# Patient Record
Sex: MALE | Race: BLACK OR AFRICAN AMERICAN | NOT HISPANIC OR LATINO | Employment: OTHER | URBAN - METROPOLITAN AREA
[De-identification: names, ages, dates, MRNs, and addresses within clinical notes are randomized per-mention and may not be internally consistent; named-entity substitution may affect disease eponyms.]

---

## 2024-09-01 ENCOUNTER — LAB REQUISITION (OUTPATIENT)
Dept: LAB | Facility: CLINIC | Age: 64
End: 2024-09-01
Payer: MEDICARE

## 2024-09-01 DIAGNOSIS — I69.392 FACIAL WEAKNESS FOLLOWING CEREBRAL INFARCTION: ICD-10-CM

## 2024-09-01 DIAGNOSIS — Z11.1 ENCOUNTER FOR SCREENING FOR RESPIRATORY TUBERCULOSIS: ICD-10-CM

## 2024-09-01 DIAGNOSIS — I69.393 ATAXIA FOLLOWING CEREBRAL INFARCTION: ICD-10-CM

## 2024-09-01 DIAGNOSIS — I69.354 HEMIPLEGIA AND HEMIPARESIS FOLLOWING CEREBRAL INFARCTION AFFECTING LEFT NON-DOMINANT SIDE (H): ICD-10-CM

## 2024-09-01 DIAGNOSIS — Z94.0 KIDNEY TRANSPLANT STATUS: ICD-10-CM

## 2024-09-03 LAB
ANION GAP SERPL CALCULATED.3IONS-SCNC: 15 MMOL/L (ref 7–15)
BUN SERPL-MCNC: 20.3 MG/DL (ref 8–23)
CALCIUM SERPL-MCNC: 10 MG/DL (ref 8.8–10.4)
CHLORIDE SERPL-SCNC: 107 MMOL/L (ref 98–107)
CREAT SERPL-MCNC: 1.6 MG/DL (ref 0.67–1.17)
EGFRCR SERPLBLD CKD-EPI 2021: 48 ML/MIN/1.73M2
GLUCOSE SERPL-MCNC: 57 MG/DL (ref 70–99)
HCO3 SERPL-SCNC: 20 MMOL/L (ref 22–29)
POTASSIUM SERPL-SCNC: 4.2 MMOL/L (ref 3.4–5.3)
SODIUM SERPL-SCNC: 142 MMOL/L (ref 135–145)

## 2024-09-03 PROCEDURE — P9604 ONE-WAY ALLOW PRORATED TRIP: HCPCS | Mod: ORL | Performed by: FAMILY MEDICINE

## 2024-09-03 PROCEDURE — 80048 BASIC METABOLIC PNL TOTAL CA: CPT | Mod: ORL | Performed by: FAMILY MEDICINE

## 2024-09-03 PROCEDURE — 86481 TB AG RESPONSE T-CELL SUSP: CPT | Mod: ORL | Performed by: FAMILY MEDICINE

## 2024-09-03 PROCEDURE — 36415 COLL VENOUS BLD VENIPUNCTURE: CPT | Mod: ORL | Performed by: FAMILY MEDICINE

## 2024-09-04 ENCOUNTER — LAB REQUISITION (OUTPATIENT)
Dept: LAB | Facility: CLINIC | Age: 64
End: 2024-09-04
Payer: MEDICARE

## 2024-09-04 DIAGNOSIS — I69.393 ATAXIA FOLLOWING CEREBRAL INFARCTION: ICD-10-CM

## 2024-09-04 DIAGNOSIS — D64.9 ANEMIA, UNSPECIFIED: ICD-10-CM

## 2024-09-04 DIAGNOSIS — N18.6 END STAGE RENAL DISEASE (H): ICD-10-CM

## 2024-09-04 DIAGNOSIS — I69.354 HEMIPLEGIA AND HEMIPARESIS FOLLOWING CEREBRAL INFARCTION AFFECTING LEFT NON-DOMINANT SIDE (H): ICD-10-CM

## 2024-09-04 DIAGNOSIS — I69.392 FACIAL WEAKNESS FOLLOWING CEREBRAL INFARCTION: ICD-10-CM

## 2024-09-05 LAB
GAMMA INTERFERON BACKGROUND BLD IA-ACNC: 0 IU/ML
M TB IFN-G BLD-IMP: NEGATIVE
M TB IFN-G CD4+ BCKGRND COR BLD-ACNC: 3.43 IU/ML
MITOGEN IGNF BCKGRD COR BLD-ACNC: 0.05 IU/ML
MITOGEN IGNF BCKGRD COR BLD-ACNC: 0.1 IU/ML
QUANTIFERON MITOGEN: 3.43 IU/ML
QUANTIFERON NIL TUBE: 0 IU/ML
QUANTIFERON TB1 TUBE: 0.05 IU/ML
QUANTIFERON TB2 TUBE: 0.1

## 2024-09-09 LAB
ALBUMIN SERPL BCG-MCNC: 4.1 G/DL (ref 3.5–5.2)
ALP SERPL-CCNC: 95 U/L (ref 40–150)
ALT SERPL W P-5'-P-CCNC: 13 U/L (ref 0–70)
ANION GAP SERPL CALCULATED.3IONS-SCNC: 11 MMOL/L (ref 7–15)
AST SERPL W P-5'-P-CCNC: 19 U/L (ref 0–45)
BASOPHILS # BLD AUTO: 0.1 10E3/UL (ref 0–0.2)
BASOPHILS NFR BLD AUTO: 1 %
BILIRUB SERPL-MCNC: 0.9 MG/DL
BUN SERPL-MCNC: 17.8 MG/DL (ref 8–23)
CALCIUM SERPL-MCNC: 9.7 MG/DL (ref 8.8–10.4)
CHLORIDE SERPL-SCNC: 105 MMOL/L (ref 98–107)
CREAT SERPL-MCNC: 1.39 MG/DL (ref 0.67–1.17)
EGFRCR SERPLBLD CKD-EPI 2021: 56 ML/MIN/1.73M2
EOSINOPHIL # BLD AUTO: 0.2 10E3/UL (ref 0–0.7)
EOSINOPHIL NFR BLD AUTO: 2 %
ERYTHROCYTE [DISTWIDTH] IN BLOOD BY AUTOMATED COUNT: 12.8 % (ref 10–15)
GLUCOSE SERPL-MCNC: 142 MG/DL (ref 70–99)
HCO3 SERPL-SCNC: 24 MMOL/L (ref 22–29)
HCT VFR BLD AUTO: 40.2 % (ref 40–53)
HGB BLD-MCNC: 12.7 G/DL (ref 13.3–17.7)
IMM GRANULOCYTES # BLD: 0.1 10E3/UL
IMM GRANULOCYTES NFR BLD: 1 %
LYMPHOCYTES # BLD AUTO: 2.2 10E3/UL (ref 0.8–5.3)
LYMPHOCYTES NFR BLD AUTO: 26 %
MCH RBC QN AUTO: 29.1 PG (ref 26.5–33)
MCHC RBC AUTO-ENTMCNC: 31.6 G/DL (ref 31.5–36.5)
MCV RBC AUTO: 92 FL (ref 78–100)
MONOCYTES # BLD AUTO: 1 10E3/UL (ref 0–1.3)
MONOCYTES NFR BLD AUTO: 11 %
NEUTROPHILS # BLD AUTO: 5.2 10E3/UL (ref 1.6–8.3)
NEUTROPHILS NFR BLD AUTO: 59 %
NRBC # BLD AUTO: 0 10E3/UL
NRBC BLD AUTO-RTO: 0 /100
PLATELET # BLD AUTO: 192 10E3/UL (ref 150–450)
POTASSIUM SERPL-SCNC: 3.9 MMOL/L (ref 3.4–5.3)
PROT SERPL-MCNC: 7.2 G/DL (ref 6.4–8.3)
RBC # BLD AUTO: 4.37 10E6/UL (ref 4.4–5.9)
SODIUM SERPL-SCNC: 140 MMOL/L (ref 135–145)
WBC # BLD AUTO: 8.7 10E3/UL (ref 4–11)

## 2024-09-09 PROCEDURE — 36415 COLL VENOUS BLD VENIPUNCTURE: CPT | Mod: ORL | Performed by: FAMILY MEDICINE

## 2024-09-09 PROCEDURE — P9603 ONE-WAY ALLOW PRORATED MILES: HCPCS | Mod: ORL | Performed by: FAMILY MEDICINE

## 2024-09-09 PROCEDURE — 80053 COMPREHEN METABOLIC PANEL: CPT | Mod: ORL | Performed by: FAMILY MEDICINE

## 2024-09-09 PROCEDURE — 85025 COMPLETE CBC W/AUTO DIFF WBC: CPT | Mod: ORL | Performed by: FAMILY MEDICINE

## 2024-09-11 ENCOUNTER — LAB REQUISITION (OUTPATIENT)
Dept: LAB | Facility: CLINIC | Age: 64
End: 2024-09-11
Payer: MEDICARE

## 2024-09-11 DIAGNOSIS — D64.9 ANEMIA, UNSPECIFIED: ICD-10-CM

## 2024-09-11 DIAGNOSIS — Z94.0 KIDNEY TRANSPLANT STATUS: ICD-10-CM

## 2024-09-11 DIAGNOSIS — N18.6 END STAGE RENAL DISEASE (H): ICD-10-CM

## 2024-09-12 ENCOUNTER — LAB REQUISITION (OUTPATIENT)
Dept: LAB | Facility: CLINIC | Age: 64
End: 2024-09-12
Payer: MEDICARE

## 2024-09-12 DIAGNOSIS — R31.9 HEMATURIA, UNSPECIFIED: ICD-10-CM

## 2024-09-12 LAB
ALBUMIN SERPL BCG-MCNC: 3.8 G/DL (ref 3.5–5.2)
ALBUMIN UR-MCNC: NEGATIVE MG/DL
ALP SERPL-CCNC: 90 U/L (ref 40–150)
ALT SERPL W P-5'-P-CCNC: 12 U/L (ref 0–70)
ANION GAP SERPL CALCULATED.3IONS-SCNC: 11 MMOL/L (ref 7–15)
APPEARANCE UR: CLEAR
AST SERPL W P-5'-P-CCNC: 19 U/L (ref 0–45)
BILIRUB SERPL-MCNC: 0.9 MG/DL
BILIRUB UR QL STRIP: NEGATIVE
BUN SERPL-MCNC: 17.5 MG/DL (ref 8–23)
CALCIUM SERPL-MCNC: 9.1 MG/DL (ref 8.8–10.4)
CHLORIDE SERPL-SCNC: 105 MMOL/L (ref 98–107)
COLOR UR AUTO: ABNORMAL
CREAT SERPL-MCNC: 1.27 MG/DL (ref 0.67–1.17)
EGFRCR SERPLBLD CKD-EPI 2021: 63 ML/MIN/1.73M2
ERYTHROCYTE [DISTWIDTH] IN BLOOD BY AUTOMATED COUNT: 12.7 % (ref 10–15)
GLUCOSE SERPL-MCNC: 150 MG/DL (ref 70–99)
GLUCOSE UR STRIP-MCNC: NEGATIVE MG/DL
HCO3 SERPL-SCNC: 24 MMOL/L (ref 22–29)
HCT VFR BLD AUTO: 35.7 % (ref 40–53)
HGB BLD-MCNC: 11.6 G/DL (ref 13.3–17.7)
HGB UR QL STRIP: NEGATIVE
HYALINE CASTS: 1 /LPF
KETONES UR STRIP-MCNC: NEGATIVE MG/DL
LEUKOCYTE ESTERASE UR QL STRIP: NEGATIVE
MCH RBC QN AUTO: 29.4 PG (ref 26.5–33)
MCHC RBC AUTO-ENTMCNC: 32.5 G/DL (ref 31.5–36.5)
MCV RBC AUTO: 91 FL (ref 78–100)
MUCOUS THREADS #/AREA URNS LPF: PRESENT /LPF
NITRATE UR QL: NEGATIVE
PH UR STRIP: 7 [PH] (ref 5–7)
PLATELET # BLD AUTO: 180 10E3/UL (ref 150–450)
POTASSIUM SERPL-SCNC: 3.9 MMOL/L (ref 3.4–5.3)
PROT SERPL-MCNC: 6.8 G/DL (ref 6.4–8.3)
RBC # BLD AUTO: 3.94 10E6/UL (ref 4.4–5.9)
RBC URINE: 1 /HPF
SODIUM SERPL-SCNC: 140 MMOL/L (ref 135–145)
SP GR UR STRIP: 1.01 (ref 1–1.03)
TACROLIMUS BLD-MCNC: 6.3 UG/L (ref 5–15)
TME LAST DOSE: NORMAL H
TME LAST DOSE: NORMAL H
UROBILINOGEN UR STRIP-MCNC: NORMAL MG/DL
WBC # BLD AUTO: 8.9 10E3/UL (ref 4–11)
WBC URINE: <1 /HPF

## 2024-09-12 PROCEDURE — 36415 COLL VENOUS BLD VENIPUNCTURE: CPT | Mod: ORL | Performed by: FAMILY MEDICINE

## 2024-09-12 PROCEDURE — 80197 ASSAY OF TACROLIMUS: CPT | Mod: ORL | Performed by: FAMILY MEDICINE

## 2024-09-12 PROCEDURE — 80053 COMPREHEN METABOLIC PANEL: CPT | Mod: ORL | Performed by: FAMILY MEDICINE

## 2024-09-12 PROCEDURE — 85027 COMPLETE CBC AUTOMATED: CPT | Mod: ORL | Performed by: FAMILY MEDICINE

## 2024-09-12 PROCEDURE — P9603 ONE-WAY ALLOW PRORATED MILES: HCPCS | Mod: ORL | Performed by: FAMILY MEDICINE

## 2024-09-12 PROCEDURE — 81001 URINALYSIS AUTO W/SCOPE: CPT | Mod: ORL | Performed by: FAMILY MEDICINE

## 2024-09-12 PROCEDURE — 87086 URINE CULTURE/COLONY COUNT: CPT | Mod: ORL | Performed by: FAMILY MEDICINE

## 2024-09-13 LAB — BACTERIA UR CULT: NO GROWTH

## 2024-10-09 ENCOUNTER — APPOINTMENT (OUTPATIENT)
Dept: MRI IMAGING | Facility: CLINIC | Age: 64
DRG: 065 | End: 2024-10-09
Attending: PSYCHIATRY & NEUROLOGY
Payer: MEDICARE

## 2024-10-09 ENCOUNTER — APPOINTMENT (OUTPATIENT)
Dept: CT IMAGING | Facility: CLINIC | Age: 64
DRG: 065 | End: 2024-10-09
Attending: EMERGENCY MEDICINE
Payer: MEDICARE

## 2024-10-09 ENCOUNTER — HOSPITAL ENCOUNTER (INPATIENT)
Facility: CLINIC | Age: 64
LOS: 3 days | Discharge: HOME HEALTH | DRG: 065 | End: 2024-10-13
Attending: EMERGENCY MEDICINE | Admitting: STUDENT IN AN ORGANIZED HEALTH CARE EDUCATION/TRAINING PROGRAM
Payer: MEDICARE

## 2024-10-09 DIAGNOSIS — Z94.0 KIDNEY REPLACED BY TRANSPLANT: ICD-10-CM

## 2024-10-09 DIAGNOSIS — I10 ESSENTIAL HYPERTENSION: ICD-10-CM

## 2024-10-09 DIAGNOSIS — E10.29 TYPE 1 DIABETES MELLITUS WITH OTHER KIDNEY COMPLICATION (H): ICD-10-CM

## 2024-10-09 DIAGNOSIS — R56.9 SEIZURE (H): ICD-10-CM

## 2024-10-09 DIAGNOSIS — I63.9 ACUTE CVA (CEREBROVASCULAR ACCIDENT) (H): Primary | ICD-10-CM

## 2024-10-09 LAB
ALBUMIN SERPL BCG-MCNC: 4.2 G/DL (ref 3.5–5.2)
ALP SERPL-CCNC: 121 U/L (ref 40–150)
ALT SERPL W P-5'-P-CCNC: 11 U/L (ref 0–70)
ANION GAP SERPL CALCULATED.3IONS-SCNC: 17 MMOL/L (ref 7–15)
APTT PPP: 22 SECONDS (ref 22–38)
AST SERPL W P-5'-P-CCNC: 18 U/L (ref 0–45)
BASE EXCESS BLDV CALC-SCNC: -2 MMOL/L (ref -3–3)
BASOPHILS # BLD AUTO: 0.1 10E3/UL (ref 0–0.2)
BASOPHILS NFR BLD AUTO: 1 %
BILIRUB DIRECT SERPL-MCNC: <0.2 MG/DL (ref 0–0.3)
BILIRUB SERPL-MCNC: 0.3 MG/DL
BUN SERPL-MCNC: 16 MG/DL (ref 8–23)
CALCIUM SERPL-MCNC: 9.8 MG/DL (ref 8.8–10.4)
CHLORIDE SERPL-SCNC: 103 MMOL/L (ref 98–107)
CREAT SERPL-MCNC: 1.45 MG/DL (ref 0.67–1.17)
EGFRCR SERPLBLD CKD-EPI 2021: 53 ML/MIN/1.73M2
EOSINOPHIL # BLD AUTO: 0.1 10E3/UL (ref 0–0.7)
EOSINOPHIL NFR BLD AUTO: 1 %
ERYTHROCYTE [DISTWIDTH] IN BLOOD BY AUTOMATED COUNT: 13 % (ref 10–15)
GLUCOSE BLDC GLUCOMTR-MCNC: 159 MG/DL (ref 70–99)
GLUCOSE SERPL-MCNC: 140 MG/DL (ref 70–99)
HCO3 BLDV-SCNC: 24 MMOL/L (ref 21–28)
HCO3 SERPL-SCNC: 19 MMOL/L (ref 22–29)
HCT VFR BLD AUTO: 41.3 % (ref 40–53)
HGB BLD-MCNC: 13.3 G/DL (ref 13.3–17.7)
IMM GRANULOCYTES # BLD: 0.1 10E3/UL
IMM GRANULOCYTES NFR BLD: 1 %
INR PPP: 1 (ref 0.85–1.15)
LACTATE BLD-SCNC: 3.2 MMOL/L
LYMPHOCYTES # BLD AUTO: 3.2 10E3/UL (ref 0.8–5.3)
LYMPHOCYTES NFR BLD AUTO: 41 %
MCH RBC QN AUTO: 29.2 PG (ref 26.5–33)
MCHC RBC AUTO-ENTMCNC: 32.2 G/DL (ref 31.5–36.5)
MCV RBC AUTO: 91 FL (ref 78–100)
MONOCYTES # BLD AUTO: 0.6 10E3/UL (ref 0–1.3)
MONOCYTES NFR BLD AUTO: 8 %
NEUTROPHILS # BLD AUTO: 3.7 10E3/UL (ref 1.6–8.3)
NEUTROPHILS NFR BLD AUTO: 48 %
NRBC # BLD AUTO: 0 10E3/UL
NRBC BLD AUTO-RTO: 0 /100
PCO2 BLDV: 41 MM HG (ref 40–50)
PH BLDV: 7.37 [PH] (ref 7.32–7.43)
PLATELET # BLD AUTO: 195 10E3/UL (ref 150–450)
PO2 BLDV: 37 MM HG (ref 25–47)
POTASSIUM SERPL-SCNC: 4.3 MMOL/L (ref 3.4–5.3)
PROT SERPL-MCNC: 7.7 G/DL (ref 6.4–8.3)
RBC # BLD AUTO: 4.55 10E6/UL (ref 4.4–5.9)
SAO2 % BLDV: 68 % (ref 70–75)
SODIUM SERPL-SCNC: 139 MMOL/L (ref 135–145)
TROPONIN T SERPL HS-MCNC: 12 NG/L
WBC # BLD AUTO: 7.7 10E3/UL (ref 4–11)

## 2024-10-09 PROCEDURE — 93005 ELECTROCARDIOGRAM TRACING: CPT

## 2024-10-09 PROCEDURE — 82803 BLOOD GASES ANY COMBINATION: CPT

## 2024-10-09 PROCEDURE — 70496 CT ANGIOGRAPHY HEAD: CPT | Mod: MA

## 2024-10-09 PROCEDURE — 85730 THROMBOPLASTIN TIME PARTIAL: CPT | Performed by: EMERGENCY MEDICINE

## 2024-10-09 PROCEDURE — 99223 1ST HOSP IP/OBS HIGH 75: CPT | Mod: AI | Performed by: STUDENT IN AN ORGANIZED HEALTH CARE EDUCATION/TRAINING PROGRAM

## 2024-10-09 PROCEDURE — 258N000003 HC RX IP 258 OP 636: Performed by: STUDENT IN AN ORGANIZED HEALTH CARE EDUCATION/TRAINING PROGRAM

## 2024-10-09 PROCEDURE — 70498 CT ANGIOGRAPHY NECK: CPT | Mod: MA

## 2024-10-09 PROCEDURE — 85610 PROTHROMBIN TIME: CPT | Performed by: EMERGENCY MEDICINE

## 2024-10-09 PROCEDURE — 36415 COLL VENOUS BLD VENIPUNCTURE: CPT | Performed by: EMERGENCY MEDICINE

## 2024-10-09 PROCEDURE — 250N000009 HC RX 250: Performed by: EMERGENCY MEDICINE

## 2024-10-09 PROCEDURE — 70551 MRI BRAIN STEM W/O DYE: CPT

## 2024-10-09 PROCEDURE — G0509 CRIT CARE TELEHEA CONSULT 50: HCPCS | Mod: G0 | Performed by: PSYCHIATRY & NEUROLOGY

## 2024-10-09 PROCEDURE — 99291 CRITICAL CARE FIRST HOUR: CPT | Mod: 25

## 2024-10-09 PROCEDURE — 82248 BILIRUBIN DIRECT: CPT | Performed by: EMERGENCY MEDICINE

## 2024-10-09 PROCEDURE — 96376 TX/PRO/DX INJ SAME DRUG ADON: CPT

## 2024-10-09 PROCEDURE — 99292 CRITICAL CARE ADDL 30 MIN: CPT

## 2024-10-09 PROCEDURE — 85025 COMPLETE CBC W/AUTO DIFF WBC: CPT | Performed by: EMERGENCY MEDICINE

## 2024-10-09 PROCEDURE — 82550 ASSAY OF CK (CPK): CPT | Performed by: STUDENT IN AN ORGANIZED HEALTH CARE EDUCATION/TRAINING PROGRAM

## 2024-10-09 PROCEDURE — 250N000011 HC RX IP 250 OP 636: Performed by: EMERGENCY MEDICINE

## 2024-10-09 PROCEDURE — 70450 CT HEAD/BRAIN W/O DYE: CPT | Mod: MA

## 2024-10-09 PROCEDURE — 84484 ASSAY OF TROPONIN QUANT: CPT | Performed by: EMERGENCY MEDICINE

## 2024-10-09 PROCEDURE — 87040 BLOOD CULTURE FOR BACTERIA: CPT | Performed by: EMERGENCY MEDICINE

## 2024-10-09 PROCEDURE — 80061 LIPID PANEL: CPT | Performed by: NURSE PRACTITIONER

## 2024-10-09 PROCEDURE — 96375 TX/PRO/DX INJ NEW DRUG ADDON: CPT

## 2024-10-09 PROCEDURE — G0378 HOSPITAL OBSERVATION PER HR: HCPCS

## 2024-10-09 PROCEDURE — 96365 THER/PROPH/DIAG IV INF INIT: CPT

## 2024-10-09 PROCEDURE — 82962 GLUCOSE BLOOD TEST: CPT

## 2024-10-09 RX ORDER — BRIMONIDINE TARTRATE 2 MG/ML
1 SOLUTION/ DROPS OPHTHALMIC 2 TIMES DAILY
COMMUNITY
Start: 2024-05-02

## 2024-10-09 RX ORDER — EZETIMIBE 10 MG/1
10 TABLET ORAL DAILY
Status: DISCONTINUED | OUTPATIENT
Start: 2024-10-10 | End: 2024-10-13 | Stop reason: HOSPADM

## 2024-10-09 RX ORDER — PANTOPRAZOLE SODIUM 40 MG/1
40 TABLET, DELAYED RELEASE ORAL EVERY EVENING
Status: DISCONTINUED | OUTPATIENT
Start: 2024-10-10 | End: 2024-10-13 | Stop reason: HOSPADM

## 2024-10-09 RX ORDER — TACROLIMUS 5 MG/1
5 CAPSULE ORAL 2 TIMES DAILY
Status: DISCONTINUED | OUTPATIENT
Start: 2024-10-10 | End: 2024-10-13 | Stop reason: HOSPADM

## 2024-10-09 RX ORDER — ASPIRIN 81 MG/1
81 TABLET ORAL DAILY
Status: DISCONTINUED | OUTPATIENT
Start: 2024-10-10 | End: 2024-10-13 | Stop reason: HOSPADM

## 2024-10-09 RX ORDER — PREDNISONE 5 MG/1
5 TABLET ORAL DAILY
Status: DISCONTINUED | OUTPATIENT
Start: 2024-10-10 | End: 2024-10-13 | Stop reason: HOSPADM

## 2024-10-09 RX ORDER — ASPIRIN 81 MG/1
81 TABLET, CHEWABLE ORAL DAILY
Status: DISCONTINUED | OUTPATIENT
Start: 2024-10-10 | End: 2024-10-13 | Stop reason: HOSPADM

## 2024-10-09 RX ORDER — AMOXICILLIN 250 MG
2 CAPSULE ORAL 2 TIMES DAILY PRN
Status: DISCONTINUED | OUTPATIENT
Start: 2024-10-09 | End: 2024-10-13 | Stop reason: HOSPADM

## 2024-10-09 RX ORDER — LEVETIRACETAM 10 MG/ML
1000 INJECTION INTRAVASCULAR ONCE
Status: COMPLETED | OUTPATIENT
Start: 2024-10-09 | End: 2024-10-09

## 2024-10-09 RX ORDER — INSULIN LISPRO 100 [IU]/ML
INJECTION, SOLUTION INTRAVENOUS; SUBCUTANEOUS
COMMUNITY
Start: 2024-10-08

## 2024-10-09 RX ORDER — AMLODIPINE BESYLATE 10 MG/1
10 TABLET ORAL DAILY
Status: DISCONTINUED | OUTPATIENT
Start: 2024-10-10 | End: 2024-10-13 | Stop reason: HOSPADM

## 2024-10-09 RX ORDER — OMEPRAZOLE 40 MG/1
40 CAPSULE, DELAYED RELEASE ORAL EVERY EVENING
COMMUNITY
Start: 2023-08-04

## 2024-10-09 RX ORDER — ONDANSETRON 2 MG/ML
4 INJECTION INTRAMUSCULAR; INTRAVENOUS ONCE
Status: COMPLETED | OUTPATIENT
Start: 2024-10-09 | End: 2024-10-09

## 2024-10-09 RX ORDER — ACETAMINOPHEN 325 MG/1
650 TABLET ORAL EVERY 4 HOURS PRN
Status: DISCONTINUED | OUTPATIENT
Start: 2024-10-09 | End: 2024-10-13 | Stop reason: HOSPADM

## 2024-10-09 RX ORDER — EZETIMIBE 10 MG/1
10 TABLET ORAL DAILY
COMMUNITY
Start: 2024-08-10

## 2024-10-09 RX ORDER — HYDRALAZINE HYDROCHLORIDE 20 MG/ML
10-20 INJECTION INTRAMUSCULAR; INTRAVENOUS
Status: DISCONTINUED | OUTPATIENT
Start: 2024-10-09 | End: 2024-10-13 | Stop reason: HOSPADM

## 2024-10-09 RX ORDER — MYCOPHENOLATE MOFETIL 250 MG/1
500 CAPSULE ORAL 2 TIMES DAILY
COMMUNITY

## 2024-10-09 RX ORDER — ASPIRIN 81 MG/1
81 TABLET ORAL DAILY
COMMUNITY

## 2024-10-09 RX ORDER — AMLODIPINE BESYLATE 10 MG/1
1 TABLET ORAL DAILY
COMMUNITY
Start: 2024-04-12

## 2024-10-09 RX ORDER — TACROLIMUS 5 MG/1
5 CAPSULE ORAL 2 TIMES DAILY
COMMUNITY

## 2024-10-09 RX ORDER — METOPROLOL SUCCINATE 50 MG/1
50 TABLET, EXTENDED RELEASE ORAL EVERY EVENING
Status: DISCONTINUED | OUTPATIENT
Start: 2024-10-10 | End: 2024-10-13 | Stop reason: HOSPADM

## 2024-10-09 RX ORDER — BRIMONIDINE TARTRATE 2 MG/ML
1 SOLUTION/ DROPS OPHTHALMIC 2 TIMES DAILY
Status: DISCONTINUED | OUTPATIENT
Start: 2024-10-09 | End: 2024-10-13 | Stop reason: HOSPADM

## 2024-10-09 RX ORDER — PREDNISONE 5 MG/1
5 TABLET ORAL DAILY
COMMUNITY
Start: 2024-04-12

## 2024-10-09 RX ORDER — LABETALOL HYDROCHLORIDE 5 MG/ML
10-20 INJECTION, SOLUTION INTRAVENOUS EVERY 10 MIN PRN
Status: DISCONTINUED | OUTPATIENT
Start: 2024-10-09 | End: 2024-10-13 | Stop reason: HOSPADM

## 2024-10-09 RX ORDER — AMOXICILLIN 250 MG
1 CAPSULE ORAL 2 TIMES DAILY PRN
Status: DISCONTINUED | OUTPATIENT
Start: 2024-10-09 | End: 2024-10-13 | Stop reason: HOSPADM

## 2024-10-09 RX ORDER — ACETAMINOPHEN 650 MG/1
650 SUPPOSITORY RECTAL EVERY 4 HOURS PRN
Status: DISCONTINUED | OUTPATIENT
Start: 2024-10-09 | End: 2024-10-13 | Stop reason: HOSPADM

## 2024-10-09 RX ORDER — INSULIN GLARGINE 100 [IU]/ML
18 INJECTION, SOLUTION SUBCUTANEOUS AT BEDTIME
COMMUNITY
Start: 2024-10-08

## 2024-10-09 RX ORDER — DIPHENHYDRAMINE HYDROCHLORIDE 50 MG/ML
25 INJECTION INTRAMUSCULAR; INTRAVENOUS ONCE
Status: COMPLETED | OUTPATIENT
Start: 2024-10-09 | End: 2024-10-09

## 2024-10-09 RX ORDER — IOPAMIDOL 755 MG/ML
67 INJECTION, SOLUTION INTRAVASCULAR ONCE
Status: COMPLETED | OUTPATIENT
Start: 2024-10-09 | End: 2024-10-09

## 2024-10-09 RX ORDER — SODIUM CHLORIDE 9 MG/ML
INJECTION, SOLUTION INTRAVENOUS CONTINUOUS
Status: DISCONTINUED | OUTPATIENT
Start: 2024-10-09 | End: 2024-10-10

## 2024-10-09 RX ORDER — METOPROLOL SUCCINATE 50 MG/1
50 TABLET, EXTENDED RELEASE ORAL EVERY EVENING
COMMUNITY
Start: 2024-07-10

## 2024-10-09 RX ORDER — ONDANSETRON 2 MG/ML
4 INJECTION INTRAMUSCULAR; INTRAVENOUS EVERY 6 HOURS PRN
Status: DISCONTINUED | OUTPATIENT
Start: 2024-10-09 | End: 2024-10-13 | Stop reason: HOSPADM

## 2024-10-09 RX ORDER — MYCOPHENOLATE MOFETIL 250 MG/1
500 CAPSULE ORAL 2 TIMES DAILY
Status: DISCONTINUED | OUTPATIENT
Start: 2024-10-10 | End: 2024-10-13 | Stop reason: HOSPADM

## 2024-10-09 RX ORDER — ONDANSETRON 4 MG/1
4 TABLET, ORALLY DISINTEGRATING ORAL EVERY 6 HOURS PRN
Status: DISCONTINUED | OUTPATIENT
Start: 2024-10-09 | End: 2024-10-13 | Stop reason: HOSPADM

## 2024-10-09 RX ADMIN — SODIUM CHLORIDE: 9 INJECTION, SOLUTION INTRAVENOUS at 23:38

## 2024-10-09 RX ADMIN — ONDANSETRON 4 MG: 2 INJECTION INTRAMUSCULAR; INTRAVENOUS at 22:41

## 2024-10-09 RX ADMIN — IOPAMIDOL 67 ML: 755 INJECTION, SOLUTION INTRAVENOUS at 20:31

## 2024-10-09 RX ADMIN — LEVETIRACETAM 1000 MG: 10 INJECTION INTRAVENOUS at 22:56

## 2024-10-09 RX ADMIN — ONDANSETRON 4 MG: 2 INJECTION INTRAMUSCULAR; INTRAVENOUS at 21:32

## 2024-10-09 RX ADMIN — PROCHLORPERAZINE EDISYLATE 10 MG: 5 INJECTION INTRAMUSCULAR; INTRAVENOUS at 23:30

## 2024-10-09 RX ADMIN — SODIUM CHLORIDE 100 ML: 9 INJECTION, SOLUTION INTRAVENOUS at 20:31

## 2024-10-09 RX ADMIN — DIPHENHYDRAMINE HYDROCHLORIDE 25 MG: 50 INJECTION, SOLUTION INTRAMUSCULAR; INTRAVENOUS at 23:32

## 2024-10-09 ASSESSMENT — ACTIVITIES OF DAILY LIVING (ADL)
ADLS_ACUITY_SCORE: 35

## 2024-10-10 ENCOUNTER — HOSPITAL ENCOUNTER (INPATIENT)
Dept: NEUROLOGY | Facility: CLINIC | Age: 64
Discharge: HOME | DRG: 065 | End: 2024-10-10
Attending: STUDENT IN AN ORGANIZED HEALTH CARE EDUCATION/TRAINING PROGRAM
Payer: MEDICARE

## 2024-10-10 ENCOUNTER — APPOINTMENT (OUTPATIENT)
Dept: CARDIOLOGY | Facility: CLINIC | Age: 64
DRG: 065 | End: 2024-10-10
Attending: NURSE PRACTITIONER
Payer: MEDICARE

## 2024-10-10 ENCOUNTER — APPOINTMENT (OUTPATIENT)
Dept: CT IMAGING | Facility: CLINIC | Age: 64
DRG: 065 | End: 2024-10-10
Attending: NURSE PRACTITIONER
Payer: MEDICARE

## 2024-10-10 ENCOUNTER — HOSPITAL ENCOUNTER (INPATIENT)
Dept: NEUROLOGY | Facility: CLINIC | Age: 64
Discharge: HOME | DRG: 065 | End: 2024-10-10
Attending: NURSE PRACTITIONER
Payer: MEDICARE

## 2024-10-10 ENCOUNTER — APPOINTMENT (OUTPATIENT)
Dept: SPEECH THERAPY | Facility: CLINIC | Age: 64
DRG: 065 | End: 2024-10-10
Attending: STUDENT IN AN ORGANIZED HEALTH CARE EDUCATION/TRAINING PROGRAM
Payer: MEDICARE

## 2024-10-10 LAB
ALBUMIN SERPL BCG-MCNC: 3.6 G/DL (ref 3.5–5.2)
ALBUMIN UR-MCNC: NEGATIVE MG/DL
ALP SERPL-CCNC: 105 U/L (ref 40–150)
ALT SERPL W P-5'-P-CCNC: 9 U/L (ref 0–70)
ANION GAP SERPL CALCULATED.3IONS-SCNC: 12 MMOL/L (ref 7–15)
APPEARANCE UR: CLEAR
AST SERPL W P-5'-P-CCNC: 15 U/L (ref 0–45)
ATRIAL RATE - MUSE: 87 BPM
BILIRUB SERPL-MCNC: 0.9 MG/DL
BILIRUB UR QL STRIP: NEGATIVE
BUN SERPL-MCNC: 14.9 MG/DL (ref 8–23)
CALCIUM SERPL-MCNC: 9.2 MG/DL (ref 8.8–10.4)
CHLORIDE SERPL-SCNC: 107 MMOL/L (ref 98–107)
CHOLEST SERPL-MCNC: 184 MG/DL
CK SERPL-CCNC: 123 U/L (ref 39–308)
COLOR UR AUTO: ABNORMAL
CREAT SERPL-MCNC: 1.37 MG/DL (ref 0.67–1.17)
DIASTOLIC BLOOD PRESSURE - MUSE: NORMAL MMHG
EGFRCR SERPLBLD CKD-EPI 2021: 57 ML/MIN/1.73M2
ERYTHROCYTE [DISTWIDTH] IN BLOOD BY AUTOMATED COUNT: 13 % (ref 10–15)
GLUCOSE BLDC GLUCOMTR-MCNC: 136 MG/DL (ref 70–99)
GLUCOSE BLDC GLUCOMTR-MCNC: 173 MG/DL (ref 70–99)
GLUCOSE BLDC GLUCOMTR-MCNC: 221 MG/DL (ref 70–99)
GLUCOSE BLDC GLUCOMTR-MCNC: 265 MG/DL (ref 70–99)
GLUCOSE BLDC GLUCOMTR-MCNC: 379 MG/DL (ref 70–99)
GLUCOSE BLDC GLUCOMTR-MCNC: 409 MG/DL (ref 70–99)
GLUCOSE SERPL-MCNC: 148 MG/DL (ref 70–99)
GLUCOSE UR STRIP-MCNC: NEGATIVE MG/DL
HCO3 SERPL-SCNC: 21 MMOL/L (ref 22–29)
HCT VFR BLD AUTO: 38.1 % (ref 40–53)
HDLC SERPL-MCNC: 60 MG/DL
HGB BLD-MCNC: 12.2 G/DL (ref 13.3–17.7)
HGB UR QL STRIP: NEGATIVE
INTERPRETATION ECG - MUSE: NORMAL
KETONES UR STRIP-MCNC: NEGATIVE MG/DL
LACTATE SERPL-SCNC: 1.5 MMOL/L (ref 0.7–2)
LDLC SERPL CALC-MCNC: 95 MG/DL
LEUKOCYTE ESTERASE UR QL STRIP: NEGATIVE
LVEF ECHO: NORMAL
MCH RBC QN AUTO: 28.9 PG (ref 26.5–33)
MCHC RBC AUTO-ENTMCNC: 32 G/DL (ref 31.5–36.5)
MCV RBC AUTO: 90 FL (ref 78–100)
MUCOUS THREADS #/AREA URNS LPF: PRESENT /LPF
NITRATE UR QL: NEGATIVE
NONHDLC SERPL-MCNC: 124 MG/DL
P AXIS - MUSE: 74 DEGREES
PA ADP BLD-ACNC: 200 PRU
PH UR STRIP: 5.5 [PH] (ref 5–7)
PLATELET # BLD AUTO: 176 10E3/UL (ref 150–450)
POTASSIUM SERPL-SCNC: 4 MMOL/L (ref 3.4–5.3)
PR INTERVAL - MUSE: 156 MS
PROT SERPL-MCNC: 6.7 G/DL (ref 6.4–8.3)
QRS DURATION - MUSE: 70 MS
QT - MUSE: 376 MS
QTC - MUSE: 452 MS
R AXIS - MUSE: -1 DEGREES
RBC # BLD AUTO: 4.22 10E6/UL (ref 4.4–5.9)
RBC URINE: 0 /HPF
SODIUM SERPL-SCNC: 140 MMOL/L (ref 135–145)
SP GR UR STRIP: 1.02 (ref 1–1.03)
SYSTOLIC BLOOD PRESSURE - MUSE: NORMAL MMHG
T AXIS - MUSE: 9 DEGREES
TRIGL SERPL-MCNC: 147 MG/DL
UROBILINOGEN UR STRIP-MCNC: NORMAL MG/DL
VENTRICULAR RATE- MUSE: 87 BPM
WBC # BLD AUTO: 11.7 10E3/UL (ref 4–11)
WBC URINE: 0 /HPF

## 2024-10-10 PROCEDURE — 85576 BLOOD PLATELET AGGREGATION: CPT

## 2024-10-10 PROCEDURE — 92526 ORAL FUNCTION THERAPY: CPT | Mod: GN | Performed by: SPEECH-LANGUAGE PATHOLOGIST

## 2024-10-10 PROCEDURE — 999N000208 ECHOCARDIOGRAM COMPLETE

## 2024-10-10 PROCEDURE — 83605 ASSAY OF LACTIC ACID: CPT

## 2024-10-10 PROCEDURE — 93306 TTE W/DOPPLER COMPLETE: CPT | Mod: 26 | Performed by: INTERNAL MEDICINE

## 2024-10-10 PROCEDURE — 36415 COLL VENOUS BLD VENIPUNCTURE: CPT

## 2024-10-10 PROCEDURE — 81001 URINALYSIS AUTO W/SCOPE: CPT | Performed by: EMERGENCY MEDICINE

## 2024-10-10 PROCEDURE — 82040 ASSAY OF SERUM ALBUMIN: CPT | Performed by: STUDENT IN AN ORGANIZED HEALTH CARE EDUCATION/TRAINING PROGRAM

## 2024-10-10 PROCEDURE — 99222 1ST HOSP IP/OBS MODERATE 55: CPT | Mod: 25 | Performed by: NURSE PRACTITIONER

## 2024-10-10 PROCEDURE — G0378 HOSPITAL OBSERVATION PER HR: HCPCS

## 2024-10-10 PROCEDURE — 92610 EVALUATE SWALLOWING FUNCTION: CPT | Mod: GN | Performed by: SPEECH-LANGUAGE PATHOLOGIST

## 2024-10-10 PROCEDURE — 95714 VEEG EA 12-26 HR UNMNTR: CPT

## 2024-10-10 PROCEDURE — 250N000012 HC RX MED GY IP 250 OP 636 PS 637: Performed by: STUDENT IN AN ORGANIZED HEALTH CARE EDUCATION/TRAINING PROGRAM

## 2024-10-10 PROCEDURE — 85014 HEMATOCRIT: CPT | Performed by: STUDENT IN AN ORGANIZED HEALTH CARE EDUCATION/TRAINING PROGRAM

## 2024-10-10 PROCEDURE — 120N000001 HC R&B MED SURG/OB

## 2024-10-10 PROCEDURE — 250N000013 HC RX MED GY IP 250 OP 250 PS 637: Performed by: STUDENT IN AN ORGANIZED HEALTH CARE EDUCATION/TRAINING PROGRAM

## 2024-10-10 PROCEDURE — 250N000009 HC RX 250: Performed by: STUDENT IN AN ORGANIZED HEALTH CARE EDUCATION/TRAINING PROGRAM

## 2024-10-10 PROCEDURE — 250N000013 HC RX MED GY IP 250 OP 250 PS 637: Performed by: INTERNAL MEDICINE

## 2024-10-10 PROCEDURE — 95816 EEG AWAKE AND DROWSY: CPT

## 2024-10-10 PROCEDURE — 999N000128 HC STATISTIC PERIPHERAL IV START W/O US GUIDANCE

## 2024-10-10 PROCEDURE — 250N000011 HC RX IP 250 OP 636: Performed by: NURSE PRACTITIONER

## 2024-10-10 PROCEDURE — 258N000003 HC RX IP 258 OP 636: Performed by: NURSE PRACTITIONER

## 2024-10-10 PROCEDURE — 95816 EEG AWAKE AND DROWSY: CPT | Mod: 26 | Performed by: PSYCHIATRY & NEUROLOGY

## 2024-10-10 PROCEDURE — G1010 CDSM STANSON: HCPCS

## 2024-10-10 PROCEDURE — 36415 COLL VENOUS BLD VENIPUNCTURE: CPT | Performed by: STUDENT IN AN ORGANIZED HEALTH CARE EDUCATION/TRAINING PROGRAM

## 2024-10-10 PROCEDURE — 82962 GLUCOSE BLOOD TEST: CPT

## 2024-10-10 PROCEDURE — 36415 COLL VENOUS BLD VENIPUNCTURE: CPT | Performed by: NURSE PRACTITIONER

## 2024-10-10 PROCEDURE — 99233 SBSQ HOSP IP/OBS HIGH 50: CPT | Performed by: INTERNAL MEDICINE

## 2024-10-10 RX ORDER — LORAZEPAM 2 MG/ML
2 INJECTION INTRAMUSCULAR
Status: DISCONTINUED | OUTPATIENT
Start: 2024-10-10 | End: 2024-10-13 | Stop reason: HOSPADM

## 2024-10-10 RX ORDER — NICOTINE POLACRILEX 4 MG
15-30 LOZENGE BUCCAL
Status: DISCONTINUED | OUTPATIENT
Start: 2024-10-10 | End: 2024-10-13 | Stop reason: HOSPADM

## 2024-10-10 RX ORDER — DEXTROSE MONOHYDRATE 25 G/50ML
25-50 INJECTION, SOLUTION INTRAVENOUS
Status: DISCONTINUED | OUTPATIENT
Start: 2024-10-10 | End: 2024-10-13 | Stop reason: HOSPADM

## 2024-10-10 RX ORDER — ASPIRIN 81 MG/1
81 TABLET, CHEWABLE ORAL DAILY
Status: DISCONTINUED | OUTPATIENT
Start: 2024-10-10 | End: 2024-10-10

## 2024-10-10 RX ADMIN — PREDNISONE 5 MG: 5 TABLET ORAL at 12:18

## 2024-10-10 RX ADMIN — BRIMONIDINE TARTRATE 1 DROP: 2 SOLUTION/ DROPS OPHTHALMIC at 21:24

## 2024-10-10 RX ADMIN — INSULIN ASPART 2 UNITS: 100 INJECTION, SOLUTION INTRAVENOUS; SUBCUTANEOUS at 01:47

## 2024-10-10 RX ADMIN — BRIMONIDINE TARTRATE 1 DROP: 2 SOLUTION/ DROPS OPHTHALMIC at 00:17

## 2024-10-10 RX ADMIN — TACROLIMUS 5 MG: 5 CAPSULE ORAL at 12:18

## 2024-10-10 RX ADMIN — INSULIN GLARGINE 9 UNITS: 100 INJECTION, SOLUTION SUBCUTANEOUS at 00:15

## 2024-10-10 RX ADMIN — LEVETIRACETAM 750 MG: 100 INJECTION, SOLUTION INTRAVENOUS at 21:24

## 2024-10-10 RX ADMIN — INSULIN ASPART 1 UNITS: 100 INJECTION, SOLUTION INTRAVENOUS; SUBCUTANEOUS at 11:31

## 2024-10-10 RX ADMIN — BRIMONIDINE TARTRATE 1 DROP: 2 SOLUTION/ DROPS OPHTHALMIC at 10:21

## 2024-10-10 RX ADMIN — TACROLIMUS 5 MG: 5 CAPSULE ORAL at 21:24

## 2024-10-10 RX ADMIN — METOPROLOL SUCCINATE 50 MG: 50 TABLET, EXTENDED RELEASE ORAL at 21:23

## 2024-10-10 RX ADMIN — TICAGRELOR 90 MG: 90 TABLET ORAL at 13:55

## 2024-10-10 RX ADMIN — MYCOPHENOLATE MOFETIL 500 MG: 250 CAPSULE ORAL at 12:18

## 2024-10-10 RX ADMIN — TICAGRELOR 90 MG: 90 TABLET ORAL at 21:24

## 2024-10-10 RX ADMIN — EZETIMIBE 10 MG: 10 TABLET ORAL at 12:19

## 2024-10-10 RX ADMIN — PANTOPRAZOLE SODIUM 40 MG: 40 TABLET, DELAYED RELEASE ORAL at 21:24

## 2024-10-10 RX ADMIN — ACETAMINOPHEN 650 MG: 650 SUPPOSITORY RECTAL at 01:49

## 2024-10-10 RX ADMIN — LEVETIRACETAM 750 MG: 100 INJECTION, SOLUTION INTRAVENOUS at 10:10

## 2024-10-10 RX ADMIN — MYCOPHENOLATE MOFETIL 500 MG: 250 CAPSULE ORAL at 21:23

## 2024-10-10 ASSESSMENT — ACTIVITIES OF DAILY LIVING (ADL)
ADLS_ACUITY_SCORE: 26
ADLS_ACUITY_SCORE: 44
ADLS_ACUITY_SCORE: 41
ADLS_ACUITY_SCORE: 41
ADLS_ACUITY_SCORE: 35
ADLS_ACUITY_SCORE: 41
ADLS_ACUITY_SCORE: 26
ADLS_ACUITY_SCORE: 41
ADLS_ACUITY_SCORE: 48
ADLS_ACUITY_SCORE: 44
ADLS_ACUITY_SCORE: 41
ADLS_ACUITY_SCORE: 41
ADLS_ACUITY_SCORE: 35
ADLS_ACUITY_SCORE: 44
ADLS_ACUITY_SCORE: 26
ADLS_ACUITY_SCORE: 41
ADLS_ACUITY_SCORE: 44
ADLS_ACUITY_SCORE: 41
ADLS_ACUITY_SCORE: 26
ADLS_ACUITY_SCORE: 31
ADLS_ACUITY_SCORE: 26

## 2024-10-10 NOTE — ED NOTES
Pt present via EMS, per EMS pt was speaking with his daughter at 1930 when he suddenly became confused with slurred speech.    Daughter called EMS and about 1945 pt had a witnessed seizure by daughter prior to ems arrival     PMH of stroke with left sided weakness at baseline. Normally at baseline pt is able to have conversation with no slurred speech present     Denies etoh or drug use

## 2024-10-10 NOTE — PROGRESS NOTES
Problem: NORMAL   Goal: Experiences normal transition  Description: INTERVENTIONS:  - Assess and monitor vital signs and lab values.   - Encourage skin-to-skin with caregiver for thermoregulation  - Assess signs, symptoms and risk factors for hypog RECEIVING UNIT ED HANDOFF REVIEW    ED Nurse Handoff Report was reviewed by: Domonique Coughlin RN on October 10, 2024 at 12:48 AM

## 2024-10-10 NOTE — PLAN OF CARE
Goal Outcome Evaluation:      Plan of Care Reviewed With: patient, spouse    Overall Patient Progress: no changeOverall Patient Progress: no change         Here with seizures, tiny infarcts. No seizure activity noted. Neuros stable. Lethargic, slightly improved midday, now awake and talking. Slightly slurred speech at times. Tele sinus blair, 55. Febrile, 100.6 this am. Denies pain. Renal, pureed diet, mildly thick liquids, meds whole in pudding. Straight cath 630 ml. Voiding now. Due to lethargy, transferred to cart with lift, otherwise up with assist 1, gait belt, walker. Continuous EEG, plan for MARZENA tomorrow at 10:30 am. Discharge plans pending. Aggression tool green.

## 2024-10-10 NOTE — ED NOTES
[facility]  ED Nurse Handoff Report    ED Chief complaint: Slurred Speech and Altered Mental Status      ED Diagnosis:   Final diagnoses:   Acute CVA (cerebrovascular accident) (H)   Seizure (H)       Code Status: DNR / DNI    Allergies: No Known Allergies    Patient Story: Pt BIBA from home. Was speaking w/ daughter @ 1930 and suddenly became confused w/ slurred speech. Dtr witnessed seizure like activity starting in the Right hand and moving throughout whole body. Appeared post-ictal upon arrival to ED, slowly cleared back to baseline per family.   Focused Assessment:  Speech garbled, L sided deficit d/t recent CVA. 96% on room air. /82, HR 80s. Slow to respond to questions. R eye is glass. Bladder scan @ 0000 for 367 mL, pt asymptomatic, recheck @ 0200.     Treatments and/or interventions provided:   Medications   senna-docusate (SENOKOT-S/PERICOLACE) 8.6-50 MG per tablet 1 tablet (has no administration in time range)     Or   senna-docusate (SENOKOT-S/PERICOLACE) 8.6-50 MG per tablet 2 tablet (has no administration in time range)   ondansetron (ZOFRAN ODT) ODT tab 4 mg (has no administration in time range)     Or   ondansetron (ZOFRAN) injection 4 mg (has no administration in time range)   acetaminophen (TYLENOL) tablet 650 mg (has no administration in time range)     Or   acetaminophen (TYLENOL) Suppository 650 mg (has no administration in time range)   labetalol (NORMODYNE/TRANDATE) injection 10-20 mg (has no administration in time range)     Or   hydrALAZINE (APRESOLINE) injection 10-20 mg (has no administration in time range)   aspirin EC tablet 81 mg (has no administration in time range)     Or   aspirin (ASA) chewable tablet 81 mg (has no administration in time range)   amLODIPine (NORVASC) tablet 10 mg ( Oral Automatically Held 10/13/24 0800)   brimonidine (ALPHAGAN) 0.2 % ophthalmic solution 1 drop (1 drop Left Eye $Given 10/10/24 0017)   ezetimibe (ZETIA) tablet 10 mg (has no  administration in time range)   insulin glargine (LANTUS PEN) injection 9 Units (9 Units Subcutaneous $Given 10/10/24 0015)   metoprolol succinate ER (TOPROL XL) 24 hr tablet 50 mg (has no administration in time range)   mycophenolate (GENERIC EQUIVALENT) capsule 500 mg (has no administration in time range)   omeprazole (PriLOSEC) CR capsule 40 mg (has no administration in time range)   predniSONE (DELTASONE) tablet 5 mg (has no administration in time range)   tacrolimus (GENERIC EQUIVALENT) capsule 5 mg (has no administration in time range)   ticagrelor (BRILINTA) tablet 90 mg (has no administration in time range)   sodium chloride 0.9 % infusion ( Intravenous $New Bag 10/9/24 2338)   prochlorperazine (COMPAZINE) injection 5 mg (has no administration in time range)   LORazepam (ATIVAN) injection 2 mg (has no administration in time range)   iopamidol (ISOVUE-370) solution 67 mL (67 mLs Intravenous $Given 10/9/24 2031)     And   sodium chloride 0.9 % bag for CT scan flush use (100 mLs As instructed $Given 10/9/24 2031)   ondansetron (ZOFRAN) injection 4 mg (4 mg Intravenous $Given 10/9/24 2132)   ondansetron (ZOFRAN) injection 4 mg (4 mg Intravenous $Given 10/9/24 2241)   levETIRAcetam (KEPPRA) intermittent infusion 1,000 mg (0 mg Intravenous Stopped 10/9/24 2315)   prochlorperazine (COMPAZINE) injection 10 mg (10 mg Intravenous $Given 10/9/24 2330)   diphenhydrAMINE (BENADRYL) injection 25 mg (25 mg Intravenous $Given 10/9/24 2332)      MR Brain w/o Contrast   Final Result   Addendum (preliminary) 1 of 1   Findings were discussed with Dr. Sampson at 10:24 PM CST on 10/09/2024.      Final   IMPRESSION:   1.  Tiny acute infarcts right thalamus, right periatrial white matter, and right cerebellar white matter.   2.  Possible subacute infarct or T2 shine through right corona radiata.   3.  Small old lacunar infarcts right basal ganglia, right corona radiata, and left periatrial white matter and presumed moderate  microvascular ischemic changes.   4.  Mild generalized atrophy.      CTA Head Neck with Contrast   Final Result   IMPRESSION:    HEAD CTA:    1.  No large vessel occlusion.   2.  Intracranial atherosclerosis, as detailed.   3.  No aneurysm or high-flow vascular malformation.      NECK CTA:   1.  No hemodynamically significant stenosis or dissection in the neck vessels.   2.  Predominantly noncalcified, mildly irregular atherosclerotic plaque at the bilateral carotid bifurcations and ICA origins.   3.  Nonvascular findings, as described.      CT Head w/o Contrast   Final Result   IMPRESSION:   1.  No acute intracranial hemorrhage, extra-axial fluid collection, or evidence of an acute transcortical infarct.   2.  Chronic right basal ganglia/corona radiata and thalamic infarcts.   3.  Mild-moderate presumed chronic microvascular ischemic change.      Preliminary findings were discussed over the phone with Dr. Marcano on 10/9/2024 at 2043 CST.          Patient's response to treatments and/or interventions: stable, improving     To be done/followed up on inpatient unit:  dysphagia screen - pt nauseous/vomiting, unable to complete, neuro checks    Does this patient have any cognitive concerns?: Disoriented to time and Disoriented to situation    Activity level - Baseline/Home:  Stand with Assist  Activity Level - Current:   Stand with Assist - able to stand & pivot, did not assess any distance    Patient's Preferred language: English   Needed?: No    Isolation: None  Infection: Not Applicable  Patient tested for COVID 19 prior to admission: NO  Bariatric?: No    Vital Signs:   Vitals:    10/09/24 2212 10/09/24 2232 10/09/24 2302 10/09/24 2330   BP: (!) 150/97 (!) 155/97 (!) 142/93 (!) 145/82   Pulse: 87 83 89 84   Resp:       SpO2: 99% 98% 98% 96%   Weight:           Cardiac Rhythm:     Was the PSS-3 completed:   Yes  What interventions are required if any?               Family Comments: family aware of  admission  OBS brochure/video discussed/provided to patient/family: Yes              Name of person given brochure if not patient:               Relationship to patient:     For the majority of the shift this patient's behavior was Green.   Behavioral interventions performed were rounding.    ED NURSE PHONE NUMBER: [phone]

## 2024-10-10 NOTE — PHARMACY-CONSULT NOTE
Pharmacy Consult to evaluate for medication related stroke core measures    Solo Ni, 64 year old male admitted due an episode where he suddenly became confused and had slurred speech while talking with his daughter  on 10/9/2024.    Thrombolytic was not given because of Patient history contraindications    VTE Prophylaxis SCDs /PCDs placed on 10/9/24, as appropriate prior to end of hospital day 2.    Antithrombotic: aspirin and ticagrelor started on 10/10/2024, as appropriate by end of hospital day 2. Continue antithrombotic therapy on discharge to meet quality measures, unless contraindicated.    Anticoagulation if history of A-fib/flutter: Patient does not have history of A-fib/flutter - anticoagulation not required for medication related stroke core measures.     LDL Cholesterol Calculated   Date Value Ref Range Status   05/23/2013 130.0 <130.1 mg/dL Final       Patient is not on a statin at home. He is on Repatha and Zetia. Zetia has been resumed and Repatha will be continued on discharge.     Recommendations:  Review LDL result when available and resume Repatha as soon as possible.     Thank you for the consult.    Gifty Becker RPH 10/10/2024 2:58 PM

## 2024-10-10 NOTE — ED PROVIDER NOTES
Emergency Department Note      History of Present Illness     Chief Complaint   Slurred Speech and Altered Mental Status      HPI   Solo Ni is a 64 year old male with a history as noted below who presents to the ED today via EMS for evaluation of slurred speech and seizure episode. EMS reports that the patient was talking with his daughter tonight when she noticed a sudden onset of slurred speech and confusion around 1930. He proceeded to have a generalized tonic-clonic seizure 15-20 min after symptoms started, so the daughter called EMS. Upon arrival, the patient was no longer having seizure activity. They report that patient has no knew weakness. Left-sided weakness and facial droop at baseline per wife. EMS reports that the patient has a history of strokes. En route, EMS reports that the patient was hypertensive at 180/80 and was very confused. The patient denies having had seizure before. Of note, history is limited due to the patient's possible stroke, and he is only oriented to self.  Denies chest pain or shortness of breath.    Independent Historian   EMS as detailed above.    Review of External Notes   I reviewed a 9/13/24 nursing triage note.   I reviewed an 8/13/24 note.     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  Cerebral infarction  CKD stage 5  CAD  Cytomegalovirus   Diabetic ulcer of toe of right foot  MCGUIRE  Gait instability  GERD  Glaucoma  H/o placement of stent in anterior descending branch of left coronary artery  Hyperopia  Hyperparathyroidism  Kidney transplant status   Leukopenia  Long term current use of systemic steroids  Osteopenia of neck of right femur   Polyneuropathy   Pseudophakia of left eye  Retinopathy  CVA due to occulusion of small artery   Intracranial aneurysm   STEMI  Hyperlipidemia   Hypertension   Type 1 diabetes mellitus   Male erectile  disorder  Stroke    Medications   Amlodipine  Repatha  Ezetimibe  Ferrous sulfate  Hydralazine   Metoprolol succinate  Omeprazole  Prednisone  Sennosides-docusate sodium  Ticagrelor   Torsemide   Aspirin 81 mg  Tacrolimus   Mycophenolate mofetil   Lipitor    Surgical History   Vitrectomy  Cataract extraction with intraocular lens implant    Physical Exam     Patient Vitals for the past 24 hrs:   BP Temp Temp src Pulse Resp SpO2 Weight   10/10/24 0135 -- 100  F (37.8  C) Oral -- -- -- --   10/10/24 0133 125/68 (!) 100.7  F (38.2  C) Axillary 84 -- 97 % --   10/10/24 0100 134/70 -- -- 84 -- 98 % --   10/10/24 0038 -- 99.4  F (37.4  C) Temporal -- -- -- --   10/10/24 0030 126/70 -- -- 79 -- 96 % --   10/10/24 0000 137/72 -- -- 86 -- 94 % --   10/09/24 2330 (!) 145/82 -- -- 84 -- 96 % --   10/09/24 2302 (!) 142/93 -- -- 89 -- 98 % --   10/09/24 2232 (!) 155/97 -- -- 83 -- 98 % --   10/09/24 2212 (!) 150/97 -- -- 87 18 99 % --   10/09/24 2132 (!) 152/81 -- -- 88 20 98 % --   10/09/24 2117 136/74 -- -- 87 19 100 % --   10/09/24 2102 127/72 -- -- 78 14 99 % --   10/09/24 2059 -- -- -- -- -- -- 107.5 kg (236 lb 15.9 oz)   10/09/24 2051 -- -- -- -- 16 -- --   10/09/24 2050 -- -- -- 110 -- 96 % --   10/09/24 2049 (!) 141/78 -- -- 88 -- 99 % --   10/09/24 2048 136/71 -- -- -- -- -- --     Physical Exam  Constitutional:       General: Confused.  Not in acute distress.  HENT:      Head: Normocephalic and atraumatic.   Eyes:     Extraocular Movements: Extraocular movements intact.      Conjunctiva/sclera: Conjunctivae normal.      Pupils: Pupils are round, and reactive to light.  Artificial right eye.  Cardiovascular:     Rate and Rhythm: Normal rate and regular rhythm.   Pulmonary:      Effort: Pulmonary effort is normal.      Breath sounds: Normal breath sounds.   Abdominal:      General: Abdomen is flat. There is no distension.      Palpations: Abdomen is soft.      Tenderness: There is no abdominal tenderness.    Musculoskeletal:      Cervical back: Normal range of motion and neck supple. No rigidity.      General: No swelling or deformity.   Skin:     General: Skin is warm and dry.   Neurological:      General: Left arm pronator drift.  Left arm finger-nose to finger dysmetria.  Left leg weakness/drift.  Asymmetric smile with left-sided facial paralysis.     Mental Status: Alert and oriented to person, but not place, and time.   Psychiatric:         Mood and Affect: Mood normal.         Behavior: Behavior normal.     Diagnostics     Lab Results   Labs Ordered and Resulted from Time of ED Arrival to Time of ED Departure   BASIC METABOLIC PANEL - Abnormal       Result Value    Sodium 139      Potassium 4.3      Chloride 103      Carbon Dioxide (CO2) 19 (*)     Anion Gap 17 (*)     Urea Nitrogen 16.0      Creatinine 1.45 (*)     GFR Estimate 53 (*)     Calcium 9.8      Glucose 140 (*)    ISTAT GASES LACTATE VENOUS POCT - Abnormal    Lactic Acid POCT 3.2 (*)     Bicarbonate Venous POCT 24      O2 Sat, Venous POCT 68 (*)     pCO2 Venous POCT 41      pH Venous POCT 7.37      pO2 Venous POCT 37      Base Excess/Deficit (+/-) POCT -2.0     GLUCOSE BY METER - Abnormal    GLUCOSE BY METER POCT 159 (*)    INR - Normal    INR 1.00     PARTIAL THROMBOPLASTIN TIME - Normal    aPTT 22     TROPONIN T, HIGH SENSITIVITY - Normal    Troponin T, High Sensitivity 12     HEPATIC FUNCTION PANEL - Normal    Protein Total 7.7      Albumin 4.2      Bilirubin Total 0.3      Alkaline Phosphatase 121      AST 18      ALT 11      Bilirubin Direct <0.20     LACTIC ACID WHOLE BLOOD - Normal    Lactic Acid 1.5     CK TOTAL - Normal         CBC WITH PLATELETS AND DIFFERENTIAL    WBC Count 7.7      RBC Count 4.55      Hemoglobin 13.3      Hematocrit 41.3      MCV 91      MCH 29.2      MCHC 32.2      RDW 13.0      Platelet Count 195      % Neutrophils 48      % Lymphocytes 41      % Monocytes 8      % Eosinophils 1      % Basophils 1      % Immature  Granulocytes 1      NRBCs per 100 WBC 0      Absolute Neutrophils 3.7      Absolute Lymphocytes 3.2      Absolute Monocytes 0.6      Absolute Eosinophils 0.1      Absolute Basophils 0.1      Absolute Immature Granulocytes 0.1      Absolute NRBCs 0.0     GLUCOSE MONITOR NURSING POCT   ROUTINE UA WITH MICROSCOPIC REFLEX TO CULTURE   GLUCOSE MONITOR NURSING POCT   GLUCOSE MONITOR NURSING POCT   GLUCOSE MONITOR NURSING POCT   GLUCOSE MONITOR NURSING POCT   GLUCOSE MONITOR NURSING POCT   BLOOD CULTURE       Imaging   MR Brain w/o Contrast   Final Result   Addendum (preliminary) 1 of 1   Findings were discussed with Dr. Sampson at 10:24 PM CST on 10/09/2024.      Final   IMPRESSION:   1.  Tiny acute infarcts right thalamus, right periatrial white matter, and right cerebellar white matter.   2.  Possible subacute infarct or T2 shine through right corona radiata.   3.  Small old lacunar infarcts right basal ganglia, right corona radiata, and left periatrial white matter and presumed moderate microvascular ischemic changes.   4.  Mild generalized atrophy.      CTA Head Neck with Contrast   Final Result   IMPRESSION:    HEAD CTA:    1.  No large vessel occlusion.   2.  Intracranial atherosclerosis, as detailed.   3.  No aneurysm or high-flow vascular malformation.      NECK CTA:   1.  No hemodynamically significant stenosis or dissection in the neck vessels.   2.  Predominantly noncalcified, mildly irregular atherosclerotic plaque at the bilateral carotid bifurcations and ICA origins.   3.  Nonvascular findings, as described.      CT Head w/o Contrast   Final Result   IMPRESSION:   1.  No acute intracranial hemorrhage, extra-axial fluid collection, or evidence of an acute transcortical infarct.   2.  Chronic right basal ganglia/corona radiata and thalamic infarcts.   3.  Mild-moderate presumed chronic microvascular ischemic change.      Preliminary findings were discussed over the phone with Dr. Marcano on 10/9/2024 at 2043 CST.            EKG   ECG taken at 2050, ECG read at 2057  Normal sinus rhythm  Possible anterior infarct, age undetermined   Rate 87 bpm. MD interval 156 ms. QRS duration 70 ms. QT/QTc 376/452 ms. P-R-T axes 74 -1 9.    Independent Interpretation   None    ED Course      Medications Administered   Medications   iopamidol (ISOVUE-370) solution 67 mL (67 mLs Intravenous $Given 10/9/24 2031)     And   sodium chloride 0.9 % bag for CT scan flush use (100 mLs As instructed $Given 10/9/24 2031)   ondansetron (ZOFRAN) injection 4 mg (4 mg Intravenous $Given 10/9/24 2132)   ondansetron (ZOFRAN) injection 4 mg (4 mg Intravenous $Given 10/9/24 2241)   levETIRAcetam (KEPPRA) intermittent infusion 1,000 mg (0 mg Intravenous Stopped 10/9/24 2315)   prochlorperazine (COMPAZINE) injection 10 mg (10 mg Intravenous $Given 10/9/24 2330)   diphenhydrAMINE (BENADRYL) injection 25 mg (25 mg Intravenous $Given 10/9/24 2332)     Procedures   Procedures     Discussion of Management   Neurology, Dr. Mojica  Admitting hospitalist, Dr. Snider    ED Course   ED Course as of 10/10/24 0154   Wed Oct 09, 2024   2026 I obtained history and examined the patient as noted above.    2028 I called a Tier 1 code stroke.   2039 I spoke with Dr. Sampson, stroke/neuro, on the phone regarding the patient. He reviewed imaging as well and agrees that it's just calcification seen.    2044 I spoke with [facility], chronic appearance, nothing acute.    2046 I spoke with the patient's wife on the phone to obtain more history.   2046 Per patient's wife over the phone, patient was at daughter's house, while she was talking to him, he started mumbling more and more and his right hand started to shake, which then developed into generalized tonic-clonic seizure. Patient has baseline left sided weakness.   2050 I rechecked and updated the patient. Following commands but seems like possible expressive aphasia. Difficulty finding the right words   2050 EKG 12-lead,  tracing only  Normal sinus rhythm.  Rate of 87.  Normal MO and QRS.  Normal QTc.  No acute ST elevation or depression.  No prior ECG for comparison.   2108 I spoke with Dr. Sampson, stroke/neuro, again on the phone regarding the patient.    2117 Creatinine(!): 1.45  Near baseline   2125 Lactic Acid POCT(!): 3.2  Likely 2/2 seizure   2227 I spoke with Dr. Sampson, stroke/neuro, again regarding the patient.    2255 I spoke with Dr. Snider, admitting hosptialist, regarding the patient.    2302 I rechecked and updated the patient and family. He is on Brilinta and aspirin 81 mg. Still feeling nauseous despite Zofran.       Additional Documentation  None    Medical Decision Making / Diagnosis     CMS Diagnoses: The lactic acid was elevated due to seizure.  At this time, there is no evidence of sepsis.    The patient has stroke symptoms:         ED Stroke specific documentation           NIHSS PDF     Patient last known well time: 1930  ED Provider first to bedside at: 2026  CT Results received at: 2044    Thrombolytics:   Not given due to:   - head trauma/stroke within the past 3 months    If treating with thrombolytics: Ensure SBP<180 and DBP<105 prior to treatment with thrombolytics.  Administering thrombolytics after treatment with IV labetalol, hydralazine, or nicardipine is reasonable once BP control is established.    Endovascular Retrieval:  Not initiated due to absence of proximal vessel occlusion    [employer] Stroke Scale (Baseline)  Time Performed: 2026     Score    Level of consciousness: (0)   Alert, keenly responsive    LOC questions: (2)   Answers neither question correctly    LOC commands: (0)   Performs both tasks correctly    Best gaze: (0)   Normal    Visual: Unable to test due to confusion    Facial palsy: (2)   Partial paralysis (total/near total of lower face) - baseline    Motor arm (left): (1)   Drift    Motor arm (right): (0)   No drift    Motor leg (left): (1)   Drift    Motor  leg (right): (0)   No drift    Limb ataxia: (1)   Present in one limb    Sensory: Unable to test due to confusion    Best language: (1)   Mild to moderate aphasia    Dysarthria: (0)   Normal    Extinction and inattention: Unable to test due to confusion        Total Score:  At least 8        Stroke Mimics were considered (including migraine headache, seizure disorder, hypoglycemia (or hyperglycemia), head or spinal trauma, CNS infection, Toxin ingestion and shock state (e.g. sepsis) .    MIPS       None    MDM   64-year-old male with history of CVA and STEMI presents to the emergency department for altered mental status and seizure activity.  Last known well time at 1930 per EMS.  History limited due to patient's mental status change.  Patient only oriented to self.  Poorly following commands.  On rapid neuro examination, patient has left lower extremity weakness, but moving toes.  Slight left arm pronator drift.  Otherwise difficult examination as patient poorly following commands.  On reevaluation after patient returned from CT imaging, it would appear that patient has some component of expressive aphasia in which patient reports that he is having difficulty with getting words out, but does appear to have some improvement in command following.  Moving left lower extremity more now.  Dysmetria to the finger-nose-finger to the left of unclear age, but unless right hemispheric dominance, unlikely corresponding with patient's speech difficulties as new CVA.  CT head and CTA head and neck negative for acute findings per stroke neurology and [facility].  However, given patient's severe calcifications to the bilateral neck arteries, stroke neurology recommends hyperacute MRI imaging for evaluation for acute CVA.  In the meantime, will evaluate for additional underlying cause of altered mental status/seizure triggers including, but not limited to, metabolic/electrolyte derangements and underlying infection.   "Discussed care plan with patient and patient's wife who voiced understanding and agreement with plan.  Answered all questions.  Additional workup and orders as listed in chart.     Ultimately, work up shows \"1.  Tiny acute infarcts right thalamus, right periatrial white matter, and right cerebellar white matter. 2.  Possible subacute infarct or T2 shine through right corona radiata.\"  Patient was admitted to hospital for further evaluation and treatment.  Stroke neurology recommended IV Keppra loading in the ED for seizure episode.  Patient currently already taking Brilinta and aspirin.  Patient's daughter and son at bedside helpful with 1 being an RN and the other being a paramedic.     Please refer to ED course above as part of continuation of MDM for details on the patient's treatment course and any potential changes or updates beyond my initial evaluation and MDM creation.    Disposition   The patient was admitted to the hospital.     Diagnosis     ICD-10-CM    1. Acute CVA (cerebrovascular accident) (H)  I63.9       2. Seizure (H)  R56.9            Discharge Medications   Current Discharge Medication List            Scribe Disclosure:  I, Mona Cerda, am serving as a scribe at 8:41 PM on 10/9/2024 to document services personally performed by Gerry Marcano DO based on my observations and the provider's statements to me.        Gerry Marcano DO  10/10/24 0154    "

## 2024-10-10 NOTE — H&P
Assessment / Plan    Solo Ni is a 64 y.o. male with PMH of T1DM, ESKD s/p renal transplant , T1DM, severe CAD with recent STEMI s/p DESx2 (07/2024), prior CVA who is being admitted for acute CVA     --Hx: Patient while talking with his daughter at 7:30pm 10/9 became confused and had slurred speech. Informed by daughter that she was going to call 911 but afterwards does not have any recollection of the events prior to presentation. Daughter at bedside states that while she was talking to him, patient became confused, then developed slurred speech, then 15-20min later starting having focal RUE tremors that progressed into diffuse tonic/clonic convulsions for a maximum time of 2 minutes. Afterwards persisted in being confused. Daughter states that mentation has slowly improved during ER stay. Of note, Patient recently completed acute rehab stay from 8/10 to 8/30 after having a CVA in 8/2024.  --Vitals/Exam: SPR 140s, HR 110s. RLE 4/5 strength. No focal sensory deficits. No loss of coordination of any extremity. CN2-12 grossly intact except for slight L facial droop. R prosthetic eye. Aox1-2 (thought he was at [facility] [place], oriented to self)  --Labs: Co2 19, Creatinine 1.5, Anion 17, Lactate 3.2  --EKG: NSR  --Imaging: Acute CVA of the R thalamus, cerebellum and parietal white matter. Possible subacute infarct of right corona radiata .Chronic infarct of R corona radiata 8/2024, along w/ R basal ganglia and L parietal white matter  --ER Course: zofran, keppra    Acute CVA of the R thalamus, cerebellum and parietal white matter  Generalized tonic/clonic Seizures w/ post ictal state  Possible subacute infarct of right corona radiata   Chronic infarct of R corona radiata 8/2024, along w/ R basal ganglia and L parietal white matter, residual L weakness  History of L lacunar infarct 1/2024  --Neuro consult  --PT OT   --SLP/NPO  --resumed aspirin, Brilinta  --resumed zetia  --seizure and fall precautions  --Neurochecks  q4  --EEG  --Defer further antiepileptic medications to neurology     HTN  HLD  CAD, Hx STEMI ARNULFO x 2 to mRCA (7/19/24), ARNULFO x 2 to LAD (2019)   Obesity  --TTE per previous H/Ps was performed 7/20/24 and demonstrated preserved ejection fraction (EF 60%), in addition to new wall-motion abnormalities in basal inferior and basal inferolateral regions   --holding amlodipine for 24hrs for permissive HTN  --resumed zetia  --resumed metoprolol  --resumed aspirin, brilinta  --resume home repatha on discharge  --outpatient diet/exercise counselling once stable    T1D  --ISS w/ mod coverage  --Mod CHO diet once patient passes Dysphasia screen  --Halved nighttime insulin lantus dose while NPO    Hx ESRD s/p kidney transplant  Lactic Acidosis, likely 2/2 Seizures  --Creatinine at baseline (1.4-1.6) on presentation  --Will trend both creatinine and lactate while on fluids (1L overnight)  --resumed mycophenolate, prednisone and tacrolimus  --consider Nephrology consult if kidney function worsens  --renal diet  --limit nephrotoxins  --Add on CK    R eye enucleation s/p prosthetic eye  Glaucoma  --resumed eye drops    GERD  --resumed PPI    Supportive Care  DVT ppx: SCDs  Diet: NPO for now  Pain Control: tylenol  Upper GI ppx: zofran  Lower GI ppx: senna   ppx: No chiang  Lines/Catheters: IV  TTE/Dopplers: None  Contact/Seizure/Fall/Delerium Precautions: Fall  PT/OT/Social/Wound/Palliative Consults: PT OT  Code Status: DNR DNI    History of Present Illness  Patient while talking with his daughter at 7:30pm 10/9 became confused and had slurred speech. Informed by daughter that she was going to call 911 but afterwards does not have any recollection of the events prior to presentation. Daughter at bedside states that while she was talking to him, patient became confused, then developed slurred speech, then 15-20min later starting having focal RUE tremors that progressed into diffuse tonic/clonic convulsions for a maximum time of 2  minutes. Afterwards persisted in being confused. Daughter states that mentation has slowly improved during ER stay. Of note, Patient recently completed acute rehab stay from 8/10 to 8/30 after having a CVA in 8/2024.    ROS: 10 point ROS neg other than the symptoms noted above in the HPI.     Objective History  BP (!) 141/78   Pulse 110   Resp 16   Wt 107.5 kg (236 lb 15.9 oz)   SpO2 96%     Constitutional: Alert and oriented to person only (doesn't know month, and reported wrong hospital name); no apparent distress.   HEENT: Normocephalic, no scleral icterus, R prosthetic eye.   Abdomen: soft, no distention/tenderness/guarding  Lungs: lungs clear to auscultation bilaterally  Heart: Regular s1s2, no evidence of murmurs  Neuro:RLE 4/5 strength. No focal sensory deficits. No loss of coordination of any extremeity. CN2-12 grossly intact except for slight L facial droop.   Skin/Extremities: No obvious signs of skin breakdown, no LE edema  Psychiatric: appropriate affect, insight and judgment  Back: No midline tenderness, no CVA tenderness    I have personally spent 77 minutes total time today in preparing to see the patient (eg, review of tests), obtaining and/or reviewing separately obtained history, performing a medically appropriate examination and/or evaluation, counseling and educating the patient/family/caregiver, ordering medications, tests, or procedures, referring and communicating with other health care professionals, documenting clinical information in the electronic or other health record, independently interpreting results and communicating results to the patient/ family/caregiver and care coordination.

## 2024-10-10 NOTE — PROGRESS NOTES
[facility]  Medicine Progress Note - Hospitalist Service  Date of Admission:  10/9/2024    Assessment & Plan   Very pleasant 64-year-old man with history of type 1 diabetes, end-stage renal disease s/p renal transplant on immunosuppressants, severe coronary artery disease with recent STEMI s/p ARNULFO x 2 in July 2024, history of CVA who was admitted for acute stroke.     Acute CVA of the R thalamus, cerebellum and parietal white matter  Generalized tonic/clonic Seizures w/ post ictal state  Possible subacute infarct of right corona radiata   Chronic infarct of R corona radiata 8/2024, along w/ R basal ganglia and L parietal white matter, residual L weakness  History of L lacunar infarct 1/2024  -Stroke neuro consult  -PT, OT evaluations  -Speech evaluation  -resumed aspirin, Brilinta  -resumed zetia  -seizure and fall precautions  -Neuro checks q4  -EEG. Continuous EEG given ongoing suspected seizures.   -Defer antiepileptic medications to neurology      Hypertension  Dyslipidemia  CAD, Hx STEMI ARNULFO x 2 to mRCA (7/19/24), ARNULFO x 2 to LAD (2019)   Obesity  TTE done 7/20/24 and demonstrated preserved ejection fraction (EF 60%), in addition to new wall-motion abnormalities in basal inferior and basal inferolateral regions   -holding amlodipine for permissive hypertension  -resumed zetia  -resumed metoprolol  -resumed aspirin, brilinta  -resume home repatha on discharge     Insulin-dependent diabetes  Managed prior to admission Lantus 18 units at bedtime and lispro/Humalog 3 times daily sliding scale with meals.  Hemoglobin A1c 6.7% in August 2024.  -Corrective dose aspart  -Mod CHO diet   -Halved nighttime insulin lantus dose while NPO. 10/10: increase to outpatient dose tonight.     Hx ESRD s/p kidney transplant  Lactic Acidosis, likely 2/2 Seizures  Creatinine at baseline (1.4-1.6) on presentation  -resumed mycophenolate, prednisone and tacrolimus  -consider Nephrology consult if kidney function  worsens     R eye enucleation s/p prosthetic eye  Glaucoma  -resumed eye drops     GERD  -resumed PPI          Diet: Combination Diet Renal Diet (dialysis); Moderate Consistent Carb (60 g CHO per Meal) Diet; Pureed Diet (level 4); Mildly Thick (level 2) (supervision/assist as needed with pt feeding himself; small bites, slow rate, alternate solids and liquids, h...  Room Service    DVT Prophylaxis: Pneumatic Compression Devices and Ambulate every shift  Heck Catheter: Not present  Lines: None     Cardiac Monitoring: ACTIVE order. Indication: Stroke, acute (48 hours)  Code Status: No CPR- Do NOT Intubate      Clinically Significant Risk Factors Present on Admission                 # Drug Induced Platelet Defect: home medication list includes an antiplatelet medication   # Hypertension: Noted on problem list                    Disposition Plan     Medically Ready for Discharge: Anticipated in 2-4 Days             Janis Mobley MD  Hospitalist Service  [facility]  Securely message with [employer] (more info)  Text page via [email or web address] Paging/Directory   ______________________________________________________________________    Interval History   Seen after EEG this afternoon. Snoring intermittently and responds to voice but doesn't stay alert. No family in the room at the time of my visit. Fever overnight noted.     Physical Exam   Vital Signs: Temp: 99.4  F (37.4  C) Temp src: Axillary BP: 121/60 Pulse: 55   Resp: 20 SpO2: 96 % O2 Device: None (Room air)    Weight: 236 lbs 15.91 oz    General Appearance: Reclined in bed, calm, cooperative  Respiratory: clear, no wheezing, nonlabored  Cardiovascular: regular to bradycardic, no murmur  GI: soft, nontender  Skin: no jaundice, no acute rashes  Neuro/psych: No tremors or seizure movements, responds to voice, moving all extremities     Medical Decision Making       52 MINUTES SPENT BY ME on the date of service doing chart review, history, exam,  documentation & further activities per the note.  MANAGEMENT DISCUSSED with the following over the past 24 hours: patient, bedside nurse, neuro team via [employer]   NOTE(S)/MEDICAL RECORDS REVIEWED over the past 24 hours: consultant notes, med admin record, nurse notes, care coordination  Tests ORDERED & REVIEWED in the past 24 hours:  - See lab/imaging results included in the data section of the note  SUPPLEMENTAL HISTORY, in addition to the patient's history, over the past 24 hours obtained from:   -         Data     I have personally reviewed the following data over the past 24 hrs:    11.7 (H)  \   12.2 (L)   / 176     140 107 14.9 /  265 (H)   4.0 21 (L) 1.37 (H) \     ALT: 9 AST: 15 AP: 105 TBILI: 0.9   ALB: 3.6 TOT PROTEIN: 6.7 LIPASE: N/A     Trop: 12 BNP: N/A     Procal: N/A CRP: N/A Lactic Acid: 1.5       INR:  1.00 PTT:  22   D-dimer:  N/A Fibrinogen:  N/A

## 2024-10-10 NOTE — PHARMACY-ADMISSION MEDICATION HISTORY
Pharmacist Admission Medication History    Admission medication history is complete. The information provided in this note is only as accurate as the sources available at the time of the update.    Information Source(s): Patient, Family member, and CareEverywhere/SureScripts via in-person    Pertinent Information: Med hx obtained from patient and daughter. Patient had AM meds but needs evening meds still     Changes made to PTA medication list:  Added: all  Deleted: None  Changed: None    Allergies reviewed with patient and updates made in EHR: yes - NKDA    Medication History Completed By: Ashli Reddy RPH 10/9/2024 10:49 PM    PTA Med List   Medication Sig Note Last Dose    amLODIPine (NORVASC) 10 MG tablet Take 1 tablet by mouth daily.  10/9/2024    aspirin 81 MG EC tablet Take 81 mg by mouth daily.  10/9/2024    brimonidine (ALPHAGAN) 0.2 % ophthalmic solution Place 1 drop Into the left eye 2 times daily.  10/9/2024 at am    evolocumab (REPATHA) 140 MG/ML prefilled autoinjector Inject 140 mg subcutaneously every 14 days. 10/9/2024: Due next week      ezetimibe (ZETIA) 10 MG tablet Take 10 mg by mouth daily.  10/9/2024    insulin lispro (HUMALOG KWIKPEN) 100 UNIT/ML (1 unit dial) KWIKPEN Inject subcutaneously 3 times daily (before meals). Sliding scale  10/9/2024    LANTUS SOLOSTAR 100 UNIT/ML soln Inject 18 Units subcutaneously at bedtime.  10/8/2024    metoprolol succinate ER (TOPROL XL) 50 MG 24 hr tablet Take 50 mg by mouth every evening.  10/8/2024    mycophenolate (GENERIC EQUIVALENT) 250 MG capsule Take 500 mg by mouth 2 times daily.  10/9/2024 at am    omeprazole (PRILOSEC) 40 MG DR capsule Take 40 mg by mouth every evening.  10/8/2024    predniSONE (DELTASONE) 5 MG tablet Take 5 mg by mouth daily.  10/9/2024    tacrolimus (GENERIC EQUIVALENT) 5 MG capsule Take 5 mg by mouth 2 times daily.  10/9/2024 at am    ticagrelor (BRILINTA) 90 MG tablet Take 1 tablet by mouth every 12 hours.  10/9/2024 at am

## 2024-10-10 NOTE — ED NOTES
Bed: ST03  Expected date:   Expected time:   Means of arrival:   Comments[facility], seizure, confused

## 2024-10-10 NOTE — CONSULTS
[facility]     Stroke Code Note          History of Present Illness     Chief Complaint: Slurred Speech and Altered Mental Status      Solo Ni is a 64 year old right-handed male who had a stroke 8/8/24 and was [facility].  He then underwent inpatient rehab, TCU stay, and had recently returned home.  His deficits had been left hemiparesis.    At home today he was at his new baseline when he suddenly became confused and had slurred speech while talking with his daughter.  He had a witnessed seizure with RUE shaking.  Post-ictally he was very confused versus aphasic.  He was weak on the right per EMS.  He had decreased level of responsiveness and minimal speech on arrival.    On my exam he had improved considerably.  He had left hemiparesis that did not score on the NIHSS, he was confused.  He did not have aphasia.           Past Medical History     Stroke risk factors: unknown    Preadmission antithrombotic regimen: aspirin    Modified [place] Score (Pre-morbid)  3 - Moderate disability.  Requires some help, but able to walk unassisted.                   Assessment and Plan       1.  Seizure potentially secondary to acute ischemic stroke.  Although the location of stroke would not be considered highly epileptogenic it is possible there is additional acute ischemic not visible on MRI.     Intravenous Thrombolysis  Not given due to:   - head trauma/stroke within the past 3 months  - minor/isolated/quickly resolving symptoms     Endovascular Treatment  Not initiated due to absence of proximal vessel occlusion     Plan:  No tenecteplase (thrombolysis) or endovascular thrombectomy--de-escalate stroke code  Medications  --Aspirin continue or if not taking: Aspirin 325mg   --Plavix 300mg if not already taking  --Keppra [id number]  Admission with stroke orderset.    --consider EEG       ___________________________________________________________________    Edson Sampson MD,  "MS  Vascular Neurology    To page me or covering stroke neurology team member, click here: [facility]  Choose \"On Call\" tab at top, then select \"NEUROLOGY/ALL SITES\" from middle drop-down box, press Enter, then look for \"stroke\" or \"telestroke\" for your site.  ___________________________________________________________________        Imaging/Labs   (personally reviewed)    CT head: no acute pathology, microvascular changes  CTA head/neck: no acute pathology  MRI Brain: three small punctate ischemic srokres         Physical Examination     BP: (!) 141/78   Pulse: 110   Resp: 16           SpO2: 96 %       Weight: 107.5 kg (236 lb 15.9 oz)    Wt Readings from Last 2 Encounters:   10/09/24 107.5 kg (236 lb 15.9 oz)       Limited neuro exam due to emergency nature of consult    General: patient sitting in bed, alert, following conversation.  Family is present in the room with him    See NIHSS        Stroke Scales       NIHSS  1a. Level of Consciousness 0-->Alert, keenly responsive   1b. LOC Questions 2-->Answers neither question correctly   1c. LOC Commands 0-->Performs both tasks correctly   2.   Best Gaze 1-->Partial gaze palsy, gaze is abnormal in one or both eyes, but forced deviation or total gaze paresis is not present   3.   Visual 0-->No visual loss   4.   Facial Palsy 0-->Normal symmetrical movements   5a. Motor Arm, Left 0-->No drift, limb holds 90 (or 45) degrees for full 10 secs   5b. Motor Arm, Right 0-->No drift, limb holds 90 (or 45) degrees for full 10 secs   6a. Motor Leg, Left 0-->No drift, leg holds 30 degree position for full 5 secs   6b. Motor Leg, right 0-->No drift, leg holds 30 degree position for full 5 secs   7.   Limb Ataxia 0-->Absent   8.   Sensory 0-->Normal, no sensory loss   9.   Best Language 0-->No aphasia, normal   10. Dysarthria 0-->Normal   11. Extinction and Inattention  1-->Visual, tactile, auditory, spatial, or personal inattention or extinction to bilateral simultaneous stimulation in " one of the sensory modalities   Total 4 (10/09/24 2120)            Labs     CBC  Lab Results   Component Value Date    HGB 13.3 10/09/2024    HCT 41.3 10/09/2024    WBC 7.7 10/09/2024     10/09/2024       BMP  Lab Results   Component Value Date     10/09/2024    POTASSIUM 4.3 10/09/2024    CHLORIDE 103 10/09/2024    CO2 19 (L) 10/09/2024    BUN 16.0 10/09/2024    CR 1.45 (H) 10/09/2024     (H) 10/09/2024    ONUR 9.8 10/09/2024       INR  INR   Date Value Ref Range Status   10/09/2024 1.00 0.85 - 1.15 Final       Data   Stroke Code Data  (for stroke code with tele)  Stroke code activated 10/09/24  2030   First stroke provider response 10/09/24  2036   Video start time 10/09/24  2117   Video end time 10/09/24  2133   Last known normal 10/09/24  1930   Time of discovery (or onset of symptoms)  10/09/24  1930   Head CT read by Stroke Neuro Provider 10/09/24  2049   Was stroke code de-escalated?             Telestroke Service Details  Type of service telemedicine diagnostic assessment of acute neurological changes   Reason telemedicine is appropriate patient requires assessment with a specialist for diagnosis and treatment of neurological symptoms   Mode of transmission secure interactive audio and video communication per Will   Originating site (patient location) [facility]    Distant site (provider location) Provider remote site        Clinically Significant Risk Factors Present on Admission     # Hypertension: Noted on problem list        Time Spent on this Encounter   I saw Solo iN on 10/09/24 as a STROKE CODE ACTIVATION.  At the time of my evaluation, Solo Ni was in critical condition due to acute onset neurologic deficits consisting of seizure and altered mental status due to acute ischemic stroke. He was at high risk of neurologic deterioration from complications of stroke or stroke reperfusion therapy.  Both intravenous thrombolysis and endovascular  thrombectomy were considered, but were ultimately deferred upon completion of his emergent clinical evaluation and review of his stat neuroimaging. The stroke code was de-escalated at that time.  I spent 70 minutes critical care decision-making time emergently evaluating and managing this patient's stroke code activation in conjunction with flying squad nursing, pharmacist, neuroradiologist, stroke residents/fellow/IFEOMA, and/or house officer/emergency department physician.

## 2024-10-10 NOTE — CONSULTS
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 He was brought to the ED 10/9/24 for evaluation following an episode of confusion and witnessed seizure with RUE shaking. Per notes, he was weak on the right following this episode. He is not able to provide any additional history regarding his presentation.     MRI brain demonstrates tiny acute infarcts in the right thalamus, right periatrial white matter and right cerebellum. On initial exam today, he was somnolent. He required repeat stimulation to arouse and was only alert for seconds before falling back to sleep. CTH was negative for acute pathology. On repeat examination, he was more briskly alert and able to answer questions and follow commands.     Stroke Evaluation Summarized    MRI/Head CT 1.  Tiny acute infarcts right thalamus, right periatrial white matter, and right cerebellar white matter.  2.  Possible subacute infarct or T2 shine through right corona radiata.  3.  Small old lacunar infarcts right basal ganglia, right corona radiata, and left periatrial white matter and presumed moderate microvascular ischemic changes.   Intracranial Vasculature No significant stenosis. Multiple absent teeth with dental carious diseas    Cervical Vasculature Predominantly noncalcified, mildly irregular atherosclerotic plaque at the bilateral carotid bifurcations and ICA origins. Severe right and moderate-severe left C5-C6 foraminal narrowing.      Echocardiogram EF 55-60%, no WMA, negative bubble study, no significant valvular disease   EKG/Telemetry sinus   Other Testing Not Applicable       LDL  No lab value available in past 30 days   A1C  6.7 on 8/9   Troponin 10/9/2024: 12 ng/L       Impression & Plan  1. Acute ischemic infarcts in the right thalamus, right periatrial white matter, and right cerebellum due to undetermined etiology. Concern for central embolic source given distribution of infarcts. Given fever, concern for endocarditis, although TTE without evidence of vegetation. Blood cultures pending.    Secondary Stroke Prevention  - Continue prior dual antiplatelet therapy with ASA 81 mg + Brilinta 90 mg BID (recent coronary stent)  --- P2Y12 pending, will repeat Friday  - LDL pending, on Repatha + Zetia PTA. Titrate to goal LDL 40-70  - A1c 6.7, within goal <7.0  - Permissive HTN today, then long term goal BP <130/80 with tighter control associated with decreased overall CV risk, if tolerated. Discussed the importance of home BP monitoring and keeping a log for PCP.  - PT/OT/SLP  - Stroke education when appropriate     Diagnostic Evaluation  - Telemetry while inpatient. If workup unrevealing, will need discharge with 14 day Ziopatch to screen for atrial fibrillation  - Consideration for LP if above workup unrevealing and he continues to be febrile. Would need to coordinate with cardiology regarding holding Brilinta     2. First time seizure, etiology unclear. No cortical left sided infarcts or encephalomalacia to suggest seizure focus. He is febrile, and infection could have lowered seizure threshold. Further workup pending.  - Loaded with Keppra 1000 mg in the ED, continue Keppra 750 mg BID.   - Continuous EEG ordered    3. Encephalopathy in the setting of witnessed seizure. Could be post-ictal, however, per notes had initial improvement in ED. Further workup includes EEG, infectious workup, neuroimaging.   - Infectious workup per primary team, blood cultures pending  - EEG (as above)    Patient Follow-up    - final recommendation pending work-up    Thank you for this consult. We will  "continue to follow.     Chinyere Waller, CNP  Vascular Neurology    To page me or covering stroke neurology team member, click here: [facility]COM  Choose \"On Call\" tab at top, then select \"NEUROLOGY/ALL SITES\" from middle drop-down box, press Enter, then look for \"stroke\" or \"telestroke\" for your site.  _____________________________________________________    Clinically Significant Risk Factors Present on Admission                 # Drug Induced Platelet Defect: home medication list includes an antiplatelet medication   # Hypertension: Noted on problem list                    Past Medical History    No past medical history on file.  Medications   Home Meds  Prior to Admission medications    Medication Sig Start Date End Date Taking? Authorizing Provider   amLODIPine (NORVASC) 10 MG tablet Take 1 tablet by mouth daily. 4/12/24  Yes Unknown, Entered By History   aspirin 81 MG EC tablet Take 81 mg by mouth daily.   Yes Unknown, Entered By History   brimonidine (ALPHAGAN) 0.2 % ophthalmic solution Place 1 drop Into the left eye 2 times daily. 5/2/24  Yes Unknown, Entered By History   evolocumab (REPATHA) 140 MG/ML prefilled autoinjector Inject 140 mg subcutaneously every 14 days. 6/20/24  Yes Unknown, Entered By History   ezetimibe (ZETIA) 10 MG tablet Take 10 mg by mouth daily. 8/10/24  Yes Unknown, Entered By History   insulin lispro (HUMALOG KWIKPEN) 100 UNIT/ML (1 unit dial) KWIKPEN Inject subcutaneously 3 times daily (before meals). Sliding scale 10/8/24  Yes Unknown, Entered By History   LANTUS SOLOSTAR 100 UNIT/ML soln Inject 18 Units subcutaneously at bedtime. 10/8/24  Yes Unknown, Entered By History   metoprolol succinate ER (TOPROL XL) 50 MG 24 hr tablet Take 50 mg by mouth every evening. 7/10/24  Yes Unknown, Entered By History   mycophenolate (GENERIC EQUIVALENT) 250 MG capsule Take 500 mg by mouth 2 times daily.   Yes Unknown, Entered By History   omeprazole (PRILOSEC) 40 MG DR capsule Take 40 mg by mouth every " evening. 8/4/23  Yes Unknown, Entered By History   predniSONE (DELTASONE) 5 MG tablet Take 5 mg by mouth daily. 4/12/24  Yes Unknown, Entered By History   tacrolimus (GENERIC EQUIVALENT) 5 MG capsule Take 5 mg by mouth 2 times daily.   Yes Unknown, Entered By History   ticagrelor (BRILINTA) 90 MG tablet Take 1 tablet by mouth every 12 hours. 7/22/24  Yes Unknown, Entered By History       Scheduled Meds  Current Facility-Administered Medications   Medication Dose Route Frequency Provider Last Rate Last Admin    [Held by provider] amLODIPine (NORVASC) tablet 10 mg  10 mg Oral Daily Sebastian Snider MD        aspirin EC tablet 81 mg  81 mg Oral Daily Janis Mobley MD        Or    aspirin (ASA) chewable tablet 81 mg  81 mg Oral or NG Tube Daily Janis Mobley MD        brimonidine (ALPHAGAN) 0.2 % ophthalmic solution 1 drop  1 drop Left Eye BID Sebastian Snider MD   1 drop at 10/10/24 1021    ezetimibe (ZETIA) tablet 10 mg  10 mg Oral or Feeding Tube Daily Sebastian Snider MD   10 mg at 10/10/24 1219    insulin aspart (NovoLOG) injection (RAPID ACTING)  1-7 Units Subcutaneous TID w/meals Janis Mobley MD        insulin glargine (LANTUS PEN) injection 9 Units  9 Units Subcutaneous At Bedtime Sebastian Snider MD   9 Units at 10/10/24 0015    levETIRAcetam (KEPPRA) 750 mg in sodium chloride 0.9 % 117.5 mL intermittent infusion  750 mg Intravenous Q12H Chinyere Waller,  mL/hr at 10/10/24 1010 750 mg at 10/10/24 1010    metoprolol succinate ER (TOPROL XL) 24 hr tablet 50 mg  50 mg Oral QPM Sebastian Snider MD        mycophenolate (GENERIC EQUIVALENT) capsule 500 mg  500 mg Oral BID Sebastian Snider MD   500 mg at 10/10/24 1218    pantoprazole (PROTONIX) EC tablet 40 mg  40 mg Oral QPM Sebastian Snider MD        predniSONE (DELTASONE) tablet 5 mg  5 mg Oral or Feeding Tube Daily Sebastian Snider MD   5 mg at 10/10/24 1218    tacrolimus (GENERIC EQUIVALENT) capsule 5  mg  5 mg Oral BID Sebastian Snider MD   5 mg at 10/10/24 1218    ticagrelor (BRILINTA) tablet 90 mg  90 mg Oral Q12H Sebastian Snider MD   90 mg at 10/10/24 1355       Infusion Meds  Current Facility-Administered Medications   Medication Dose Route Frequency Provider Last Rate Last Admin       Allergies   No Known Allergies       PHYSICAL EXAMINATION   Temp:  [99.4  F (37.4  C)-100.9  F (38.3  C)] 99.4  F (37.4  C)  Pulse:  [] 55  Resp:  [14-20] 20  BP: (121-155)/(60-97) 121/60  SpO2:  [94 %-100 %] 96 %    Neurologic  Mental Status:   not alert, requires central stimuli to arouse and immediately falls back to sleep, able to elevate arms to command, speech unintelligible  Cranial Nerves:   face appears symmetric, does not protrude tongue, R eye is prosthetic, EOMs intact in L eye, L pupil sluggish but reactive  Motor:  normal muscle tone and bulk, no abnormal movements, mild RUE drift, does not lift either leg antigravity but withdraws to noxious  Reflexes:   deferred   Sensory:   see motor  Coordination:   unable to participate in formal testing  Station/Gait:  deferred    Stroke Scales    NIHSS  1a. Level of Consciousness 2-->Not alert, requires repeated stimulation to attend, or is obtunded and requires strong or painful stimulation to make movements (not stereotyped)   1b. LOC Questions 2-->Answers neither question correctly   1c. LOC Commands 1-->Performs one task correctly   2.   Best Gaze 0-->Normal   3.   Visual 0-->No visual loss   4.   Facial Palsy 0-->Normal symmetrical movements   5a. Motor Arm, Left 0-->No drift, limb holds 90 (or 45) degrees for full 10 secs   5b. Motor Arm, Right 1-->Drift, limb holds 90 (or 45) degrees, but drifts down before full 10 secs, does not hit bed or other support   6a. Motor Leg, Left 3-->No effort against gravity, leg falls to bed immediately   6b. Motor Leg, right 3-->No effort against gravity, leg falls to bed immediately   7.   Limb Ataxia 0-->Absent   8.    Sensory 0-->Normal, no sensory loss   9.   Best Language 2-->Severe aphasia, all communication is through fragmentary expression, great need for inference, questioning, and guessing by the listener. Range of information that can be exchanged is limited, listener carries burden of. . . (see row details)   10. Dysarthria 1-->Mild-to-moderate dysarthria, patient slurs at least some words and, at worst, can be understood with some difficulty   11. Extinction and Inattention  0-->No abnormality   Total 15 (10/10/24 0910)       Imaging  I personally reviewed all imaging; relevant findings per HPI.    Labs Data   CBC  Recent Labs   Lab 10/10/24  0746 10/09/24  2045   WBC 11.7* 7.7   RBC 4.22* 4.55   HGB 12.2* 13.3   HCT 38.1* 41.3    195     Basic Metabolic Panel   Recent Labs   Lab 10/10/24  1116 10/10/24  0746 10/10/24  0637 10/09/24  2226 10/09/24  2045   NA  --  140  --   --  139   POTASSIUM  --  4.0  --   --  4.3   CHLORIDE  --  107  --   --  103   CO2  --  21*  --   --  19*   BUN  --  14.9  --   --  16.0   CR  --  1.37*  --   --  1.45*   * 148* 136*   < > 140*   ONUR  --  9.2  --   --  9.8    < > = values in this interval not displayed.     Liver Panel  Recent Labs   Lab 10/10/24  0746 10/09/24  2045   PROTTOTAL 6.7 7.7   ALBUMIN 3.6 4.2   BILITOTAL 0.9 0.3   ALKPHOS 105 121   AST 15 18   ALT 9 11     INR    Recent Labs   Lab Test 10/09/24  2045   INR 1.00           Stroke Consult Data Data   This was a non-emergent, non-telestroke consult.  I have personally spent a total of 35 minutes providing care today, time spent in reviewing medical records and devising the plan as recorded above.

## 2024-10-10 NOTE — PLAN OF CARE
Reason for Admission: Confusion, slurred speech, seizure, tiny acute interacts in the R thalamus.     Cognitive/Mentation:A/O x3, disoriented to time.   Neuros/CMS: Slurred/garbled speech, confused,R glass eye, diminished/absent sensation on L side.   VS: Stable ex febrile, PRN Tylenol given.  GI: Active BS, no BM this shift.   : Voiding using urinal at bedside, .   Pulmonary: LS clear.   Pain: denies.     Drains/Lines: R PIV infusing NS @100 mL/hr.   Skin: Intact. L big toe amputated.   Activity: Assist x1 with GB.   Diet: NPO for failing dysphagia prescreen.     Therapies recs: pending.   Discharge: pending.     Aggression Spotlight Tool: green.     End of shift summary: Still need UA collected.

## 2024-10-11 ENCOUNTER — APPOINTMENT (OUTPATIENT)
Dept: OCCUPATIONAL THERAPY | Facility: CLINIC | Age: 64
DRG: 065 | End: 2024-10-11
Attending: STUDENT IN AN ORGANIZED HEALTH CARE EDUCATION/TRAINING PROGRAM
Payer: MEDICARE

## 2024-10-11 ENCOUNTER — APPOINTMENT (OUTPATIENT)
Dept: CARDIOLOGY | Facility: CLINIC | Age: 64
DRG: 065 | End: 2024-10-11
Attending: NURSE PRACTITIONER
Payer: MEDICARE

## 2024-10-11 LAB
GLUCOSE BLDC GLUCOMTR-MCNC: 218 MG/DL (ref 70–99)
GLUCOSE BLDC GLUCOMTR-MCNC: 231 MG/DL (ref 70–99)
GLUCOSE BLDC GLUCOMTR-MCNC: 266 MG/DL (ref 70–99)
GLUCOSE BLDC GLUCOMTR-MCNC: 279 MG/DL (ref 70–99)
LVEF ECHO: NORMAL
PA ADP BLD-ACNC: 22 PRU

## 2024-10-11 PROCEDURE — 258N000003 HC RX IP 258 OP 636: Performed by: NURSE PRACTITIONER

## 2024-10-11 PROCEDURE — 120N000001 HC R&B MED SURG/OB

## 2024-10-11 PROCEDURE — 250N000009 HC RX 250: Performed by: INTERNAL MEDICINE

## 2024-10-11 PROCEDURE — 36415 COLL VENOUS BLD VENIPUNCTURE: CPT | Performed by: NURSE PRACTITIONER

## 2024-10-11 PROCEDURE — 250N000013 HC RX MED GY IP 250 OP 250 PS 637: Performed by: STUDENT IN AN ORGANIZED HEALTH CARE EDUCATION/TRAINING PROGRAM

## 2024-10-11 PROCEDURE — 250N000012 HC RX MED GY IP 250 OP 636 PS 637: Performed by: STUDENT IN AN ORGANIZED HEALTH CARE EDUCATION/TRAINING PROGRAM

## 2024-10-11 PROCEDURE — 93320 DOPPLER ECHO COMPLETE: CPT | Mod: 26 | Performed by: INTERNAL MEDICINE

## 2024-10-11 PROCEDURE — 95720 EEG PHY/QHP EA INCR W/VEEG: CPT | Performed by: PSYCHIATRY & NEUROLOGY

## 2024-10-11 PROCEDURE — 99233 SBSQ HOSP IP/OBS HIGH 50: CPT | Performed by: INTERNAL MEDICINE

## 2024-10-11 PROCEDURE — 250N000011 HC RX IP 250 OP 636: Performed by: NURSE PRACTITIONER

## 2024-10-11 PROCEDURE — 93312 ECHO TRANSESOPHAGEAL: CPT | Mod: 26 | Performed by: INTERNAL MEDICINE

## 2024-10-11 PROCEDURE — 93325 DOPPLER ECHO COLOR FLOW MAPG: CPT | Mod: 26 | Performed by: INTERNAL MEDICINE

## 2024-10-11 PROCEDURE — 250N000011 HC RX IP 250 OP 636: Performed by: INTERNAL MEDICINE

## 2024-10-11 PROCEDURE — 97535 SELF CARE MNGMENT TRAINING: CPT | Mod: GO

## 2024-10-11 PROCEDURE — 85576 BLOOD PLATELET AGGREGATION: CPT

## 2024-10-11 PROCEDURE — 93325 DOPPLER ECHO COLOR FLOW MAPG: CPT

## 2024-10-11 PROCEDURE — 97165 OT EVAL LOW COMPLEX 30 MIN: CPT | Mod: GO

## 2024-10-11 PROCEDURE — 999N000183 HC STATISTIC TEE INCLUDES SEDATION

## 2024-10-11 PROCEDURE — 250N000013 HC RX MED GY IP 250 OP 250 PS 637: Performed by: INTERNAL MEDICINE

## 2024-10-11 PROCEDURE — 999N000184 HC STATISTIC TELEMETRY

## 2024-10-11 RX ORDER — LIDOCAINE 50 MG/G
OINTMENT TOPICAL ONCE
Status: COMPLETED | OUTPATIENT
Start: 2024-10-11 | End: 2024-10-11

## 2024-10-11 RX ORDER — LIDOCAINE HYDROCHLORIDE 40 MG/ML
1.5 SOLUTION TOPICAL ONCE
Status: COMPLETED | OUTPATIENT
Start: 2024-10-11 | End: 2024-10-11

## 2024-10-11 RX ORDER — NALOXONE HYDROCHLORIDE 0.4 MG/ML
0.2 INJECTION, SOLUTION INTRAMUSCULAR; INTRAVENOUS; SUBCUTANEOUS
Status: ACTIVE | OUTPATIENT
Start: 2024-10-11 | End: 2024-10-13

## 2024-10-11 RX ORDER — GLYCOPYRROLATE 0.2 MG/ML
0.1 INJECTION, SOLUTION INTRAMUSCULAR; INTRAVENOUS ONCE
Status: COMPLETED | OUTPATIENT
Start: 2024-10-11 | End: 2024-10-11

## 2024-10-11 RX ORDER — NALOXONE HYDROCHLORIDE 0.4 MG/ML
0.4 INJECTION, SOLUTION INTRAMUSCULAR; INTRAVENOUS; SUBCUTANEOUS
Status: ACTIVE | OUTPATIENT
Start: 2024-10-11 | End: 2024-10-13

## 2024-10-11 RX ORDER — SODIUM CHLORIDE 9 MG/ML
1000 INJECTION, SOLUTION INTRAVENOUS CONTINUOUS
Status: DISCONTINUED | OUTPATIENT
Start: 2024-10-11 | End: 2024-10-12

## 2024-10-11 RX ORDER — FENTANYL CITRATE 50 UG/ML
25 INJECTION, SOLUTION INTRAMUSCULAR; INTRAVENOUS
Status: DISCONTINUED | OUTPATIENT
Start: 2024-10-11 | End: 2024-10-13

## 2024-10-11 RX ORDER — MAGNESIUM HYDROXIDE/ALUMINUM HYDROXICE/SIMETHICONE 120; 1200; 1200 MG/30ML; MG/30ML; MG/30ML
30 SUSPENSION ORAL EVERY 8 HOURS PRN
Status: ACTIVE | OUTPATIENT
Start: 2024-10-11 | End: 2024-10-13

## 2024-10-11 RX ORDER — FLUMAZENIL 0.1 MG/ML
0.2 INJECTION, SOLUTION INTRAVENOUS
Status: ACTIVE | OUTPATIENT
Start: 2024-10-11 | End: 2024-10-13

## 2024-10-11 RX ORDER — ACETAMINOPHEN 325 MG/1
650 TABLET ORAL EVERY 4 HOURS PRN
Status: DISCONTINUED | OUTPATIENT
Start: 2024-10-11 | End: 2024-10-11 | Stop reason: ALTCHOICE

## 2024-10-11 RX ORDER — BENZOCAINE/MENTHOL 6 MG-10 MG
1 LOZENGE MUCOUS MEMBRANE 3 TIMES DAILY PRN
Status: ACTIVE | OUTPATIENT
Start: 2024-10-11 | End: 2024-10-13

## 2024-10-11 RX ORDER — DEXTROSE MONOHYDRATE 25 G/50ML
9.5 INJECTION, SOLUTION INTRAVENOUS
Status: DISCONTINUED | OUTPATIENT
Start: 2024-10-11 | End: 2024-10-13 | Stop reason: HOSPADM

## 2024-10-11 RX ADMIN — EZETIMIBE 10 MG: 10 TABLET ORAL at 10:24

## 2024-10-11 RX ADMIN — LIDOCAINE HYDROCHLORIDE 1.5 ML: 40 SOLUTION TOPICAL at 12:44

## 2024-10-11 RX ADMIN — MIDAZOLAM 0.5 MG: 1 INJECTION INTRAMUSCULAR; INTRAVENOUS at 13:23

## 2024-10-11 RX ADMIN — MIDAZOLAM 0.5 MG: 1 INJECTION INTRAMUSCULAR; INTRAVENOUS at 13:22

## 2024-10-11 RX ADMIN — FENTANYL CITRATE 50 MCG: 50 INJECTION, SOLUTION INTRAMUSCULAR; INTRAVENOUS at 12:20

## 2024-10-11 RX ADMIN — GLYCOPYRROLATE 0.1 MG: 0.2 INJECTION, SOLUTION INTRAMUSCULAR; INTRAVENOUS at 12:44

## 2024-10-11 RX ADMIN — LEVETIRACETAM 750 MG: 100 INJECTION, SOLUTION INTRAVENOUS at 10:27

## 2024-10-11 RX ADMIN — MIDAZOLAM 1 MG: 1 INJECTION INTRAMUSCULAR; INTRAVENOUS at 13:20

## 2024-10-11 RX ADMIN — TICAGRELOR 90 MG: 90 TABLET ORAL at 10:24

## 2024-10-11 RX ADMIN — MYCOPHENOLATE MOFETIL 500 MG: 250 CAPSULE ORAL at 10:23

## 2024-10-11 RX ADMIN — TOPICAL ANESTHETIC 1 ML: 200 SPRAY DENTAL; PERIODONTAL at 13:14

## 2024-10-11 RX ADMIN — LEVETIRACETAM 750 MG: 100 INJECTION, SOLUTION INTRAVENOUS at 21:31

## 2024-10-11 RX ADMIN — BRIMONIDINE TARTRATE 1 DROP: 2 SOLUTION/ DROPS OPHTHALMIC at 10:25

## 2024-10-11 RX ADMIN — ASPIRIN 81 MG: 81 TABLET, COATED ORAL at 10:24

## 2024-10-11 RX ADMIN — PREDNISONE 5 MG: 5 TABLET ORAL at 10:23

## 2024-10-11 RX ADMIN — TACROLIMUS 5 MG: 5 CAPSULE ORAL at 10:34

## 2024-10-11 ASSESSMENT — ACTIVITIES OF DAILY LIVING (ADL)
ADLS_ACUITY_SCORE: 29
ADLS_ACUITY_SCORE: 31
ADLS_ACUITY_SCORE: 29
ADLS_ACUITY_SCORE: 31
ADLS_ACUITY_SCORE: 26
ADLS_ACUITY_SCORE: 31
ADLS_ACUITY_SCORE: 31
ADLS_ACUITY_SCORE: 30
ADLS_ACUITY_SCORE: 31
ADLS_ACUITY_SCORE: 29
ADLS_ACUITY_SCORE: 30
ADLS_ACUITY_SCORE: 31
ADLS_ACUITY_SCORE: 26
ADLS_ACUITY_SCORE: 30
ADLS_ACUITY_SCORE: 31
ADLS_ACUITY_SCORE: 30
ADLS_ACUITY_SCORE: 30
ADLS_ACUITY_SCORE: 29
ADLS_ACUITY_SCORE: 31
ADLS_ACUITY_SCORE: 29
ADLS_ACUITY_SCORE: 31
PREVIOUS_RESPONSIBILITIES: MEDICATION MANAGEMENT;FINANCES
ADLS_ACUITY_SCORE: 26
ADLS_ACUITY_SCORE: 29

## 2024-10-11 NOTE — PROGRESS NOTES
"   10/11/24 0900   Appointment Info   Signing Clinician's Name / Credentials (OT) Rosaura Andrew, OTR/L   Rehab Comments (OT) kept daily, pt declining TCU   Living Environment   People in Home spouse   Current Living Arrangements house   Home Accessibility stairs to enter home;stairs within home   Number of Stairs, Main Entrance 8   Stair Railings, Main Entrance railings safe and in good condition   Number of Stairs, Within Home, Primary greater than 10 stairs  (to go upstairs)   Stair Railings, Within Home, Primary railings safe and in good condition   Transportation Anticipated family or friend will provide   Living Environment Comments Pt is a questionable historian. Pt resides with spouse. Spouse works at a hospital, manages patients. Pt uses FWW. Pt reports he had just discharged from a TCU.   Self-Care   Usual Activity Tolerance good   Current Activity Tolerance moderate   Regular Exercise No   Equipment Currently Used at Home walker, standard   Fall history within last six months no   Activity/Exercise/Self-Care Comment Ind ADLs baseline.   Instrumental Activities of Daily Living (IADL)   Previous Responsibilities medication management;finances   IADL Comments Spouse assist with cooking/cleaning/driving   General Information   Onset of Illness/Injury or Date of Surgery 10/09/24   Referring Physician Sebastian Snider MD   Patient/Family Therapy Goal Statement (OT) To go home   Additional Occupational Profile Info/Pertinent History of Current Problem Per chart: \"Very pleasant 64-year-old man with history of type 1 diabetes, end-stage renal disease s/p renal transplant on immunosuppressants, severe coronary artery disease with recent STEMI s/p ARNULFO x 2 in July 2024, history of CVA who was admitted for acute stroke.\"   Existing Precautions/Restrictions fall;seizures   Limitations/Impairments safety/cognitive   Cognitive Status Examination   Orientation Status person  (oriented to year only)   Affect/Mental Status " (Cognitive) confused   Follows Commands follows one-step commands;75-90% accuracy;increased processing time needed;physical/tactile prompts required;repetition of directions required;verbal cues/prompting required   Safety Deficit insight into deficits/self-awareness;judgment;problem-solving   Executive Function Deficit insight/awareness of deficits;judgment;problem-solving/reasoning;planning/decision-making;self-monitoring/self-correction   Cognitive Screens/Assessments   Cognitive Assessments Completed M-ACE   Mini-Addenbrooke s Cognitive Examination (M-ACE) Total Score (out of 30)  8/30   M-ACE Norms A score of 21 or less indicates suspected cognitive impairment   M-ACE Domains Assessed Cognitive Screen for Attention, Memory, Fluency,  Visuospatial Abilities   M-ACE Interpretation Patient participated in the M-ACE Version A cognition screen, scoring 8/30 (A score of 21 and below = cognitive impairment). Scoring breakdown for each sub-section of the M-ACE cognition screen are as follows: Orientation/Attention - scoring 1/4 ; Memory (Short Term) - scoring 5/7 ; Fluency - scoring 0/7 ; Visuospatial Function - scoring 1/5 ; Memory (Delayed Recall) - scoring 1/7. Overall results may indicate safety implications within completion of I/ADLs. Pt's overall score may warrant further in-depth standardized cognitive assessment.   Visual Perception   Visual Impairment/Limitations corrective lenses full-time;other (see comments)   Impact of Vision Impairment on Function (Vision) hx of R eye enucleation s/p prosthetic eye   Sensory   Sensory Quick Adds sensation intact   Range of Motion Comprehensive   Comment, General Range of Motion BUEs WFL   Strength Comprehensive (MMT)   Comment, General Manual Muscle Testing (MMT) Assessment BUEs WFL   Coordination   Upper Extremity Coordination No deficits were identified   Bed Mobility   Bed Mobility supine-sit   Comment (Bed Mobility) CGA   Transfers   Transfers sit-stand  transfer;toilet transfer   Sit-Stand Transfer   Sit/Stand Transfer Comments CGA   Toilet Transfer   Toilet Transfer Comments CGA   Balance   Balance Comments Pt with mild unsteadiness noted with use of FWW   Activities of Daily Living   BADL Assessment/Intervention lower body dressing;toileting;grooming   Lower Body Dressing Assessment/Training   Comment, (Lower Body Dressing) Min A   Grooming Assessment/Training   Comment, (Grooming) CGA   Toileting   Comment, (Toileting) SBA   Clinical Impression   Criteria for Skilled Therapeutic Interventions Met (OT) Yes, treatment indicated   OT Diagnosis Decr ind with I/ADLs   OT Problem List-Impairments impacting ADL problems related to;activity tolerance impaired;balance;cognition;mobility   Assessment of Occupational Performance 3-5 Performance Deficits   Identified Performance Deficits dressing, bathing, toileting, IADLs   Planned Therapy Interventions (OT) ADL retraining;IADL retraining;cognition;transfer training   Clinical Decision Making Complexity (OT) problem focused assessment/low complexity   Risk & Benefits of therapy have been explained patient   OT Total Evaluation Time   OT Eval, Low Complexity Minutes (81402) 15   OT Goals   Therapy Frequency (OT) 6 times/week   OT Predicted Duration/Target Date for Goal Attainment 10/18/24   OT Goals Hygiene/Grooming;Lower Body Dressing;Upper Body Dressing;Toilet Transfer/Toileting;Cognition   OT: Hygiene/Grooming modified independent;while standing;within precautions  (FWW)   OT: Upper Body Dressing Modified independent;within precautions   OT: Lower Body Dressing Modified independent;within precautions   OT: Toilet Transfer/Toileting Modified independent;toilet transfer;cleaning and garment management   OT: Cognitive Patient/caregiver will verbalize understanding of cognitive assessment results/recommendations as needed for safe discharge planning   Self-Care/Home Management   Self-Care/Home Mgmt/ADL, Compensatory, Meal  Prep Minutes (84573) 23   Symptoms Noted During/After Treatment (Meal Preparation/Planning Training) fatigue   Treatment Detail/Skilled Intervention Pt greeted in supine, agreeable to OT session, on continuous EEG. Occasional safety cues throughout session, redirectable. Pt demos supine > sitting EOB with Min A, HOB raised. Pt stands from EOB with CGA and FWW. Pt ambulates to BR with CGA and assist with lines. Pt completes toilet tx with CGA for safety, use of grab bar, FWW. Pt voids with SBA, pt with Min A to don a clean pull up brief, pt able to pull up over B knees and hips in standing. pt ambulates to sink with CGA and FWW and completes hand hygiene. Pt ambulates to chair and sits in chair with CGA and FWW. Pt at end of session with needs met, items/call light in reach, alarm set.   OT Discharge Planning   OT Plan TB dressing, ADL sequencing, higher level cog tasks/sequencing   OT Discharge Recommendation (DC Rec) Transitional Care Facility   OT Rationale for DC Rec Pt functioning below baseline with noted impairment in cognition, activity caridad, balance/mobility, impacting safety/ind with I/ADLs. Pt reports he resides with his spouse in a multi-level home. Pt reports she assist with driving, cooking/cleaning, pt typically manages his own meds. Pt scoring 8/30 on M-ACE cog screen. Recommend skilled TCU for incr safety/ind with I/ADLs. Pt declining this at this time. If pt were to return home, recommend 24/7 assist with all IADLs, supervision with all ADLs, [facility] OT for home safety evaluation and functional cognition. OT will continue to follow and update recs as appropriate.   OT Brief overview of current status Goals of therapy will be to address safe mobility and make recs for d/c to next level of care. Pt and RN will continue to follow all falls risk precautions as documented by RN staff while hospitalized  (CGA mobility FWW; Min A bed mob; safety cues)   Total Session Time   Timed Code Treatment Minutes 23    Total Session Time (sum of timed and untimed services) 38

## 2024-10-11 NOTE — PLAN OF CARE
Goal Outcome Evaluation:      Plan of Care Reviewed With: patient        Rule out seizure/stroke.  Neuros - alert & oriented/forgetful, sleeping between cares/drowsy while sitting in chair, arouses to voice, letting his needs be known; generalized weakness/no dizziness; vision deficits baseline - right glasses eye, glasses at bedside. Seizure precautions maintained/EEG monitoring - no seizure like activity noted.  VSS, RA, no shortness of breath.  Tele monitored - NSR. NPO for procedure at 1300, sips of water with morning med pass. Up with 2 assist/gait belt & walker/sat in chair after therapy session. Incontinent overnight of bladder/loose bm yesterday.  Denies pain. Pt scoring green on the Aggression Stop Light Tool. Leaving floor at noon to [facility] for procedure prep, patient agrees with plan. EEG disconnected prior to leaving floor per neuro fellow stroke team John. Updated daughter & wife on the phone/MD also planning to call wife.   1445 Arrived back to floor from [facility] recently. Patient lethargic but arousable,disoriented to exact location & exact date, knows in hospital and its Friday. Requesting a cup of coffee. No oral intake until after 1500 per care suites. No diet order at this time. Neuros - slight ataxic with left upper/strong grasp. No dizziness or pain. Bradycardic in 40s upon arrival back to unit, very lethargic (hospitalist made aware, no new orders). rEport give to new nurse. Zio patch orders at discharge.

## 2024-10-11 NOTE — PROGRESS NOTES
Spoke to Zhane TOM, Video swallow rescheduled d/t pt needing to be NPO for MARZENA, per SLP no immediate swallow concerns about performing MARZENA today.

## 2024-10-11 NOTE — PROGRESS NOTES
PRELIMINARY EEG REPORT:    EEG was reviewed till 3 pm. There was poorly-formed 7 Hz activity over the posterior head regions, which was symmetric and reactive.  Diffuse theta-delta slowing was present.  No epileptiform discharges or electrographic seizures were recorded.  Findings are consistent with moderate diffuse nonspecific encephalopathy.        Yudy Grier MD

## 2024-10-11 NOTE — PRE-PROCEDURE
GENERAL PRE-PROCEDURE:   Procedure:  MARZENA  Date/Time:  10/11/2024 1:14 PM    Verbal consent obtained?: Yes    Written consent obtained?: Yes    Risks and benefits: Risks, benefits and alternatives were discussed    DC Plan: Appropriate discharge home plan in place for patients who are going home after procedure   Consent given by:  Patient  Patient states understanding of procedure being performed: Yes    Patient's understanding of procedure matches consent: Yes    Procedure consent matches procedure scheduled: Yes    Expected level of sedation:  Moderate  Appropriately NPO:  Yes  ASA Class:  2  Mallampati  :  Grade 2- soft palate, base of uvula, tonsillar pillars, and portion of posterior pharyngeal wall visible  Lungs:  Lungs clear with good breath sounds bilaterally  Heart:  Normal heart sounds and rate  History & Physical reviewed:  History and physical reviewed and no updates needed  Statement of review:  I have reviewed the lab findings, diagnostic data, medications, and the plan for sedation

## 2024-10-11 NOTE — PROGRESS NOTES
PRELIMINARY EEG REPORT:     Overnight vEEG was reviewed till 10 am. There was poorly-sustained 7-8 Hz activity over the posterior head regions, which was symmetric and reactive.  Diffuse theta-delta slowing was present during waking.  Vertex waves and sleep spindles were seen during stage ll sleep.  No epileptiform discharges or electrographic seizures were recorded.  Findings are consistent with moderate diffuse nonspecific encephalopathy.          Yudy Grier MD

## 2024-10-11 NOTE — PLAN OF CARE
Mona Weber RN on 10/11/2024 at 6:20 AM    Reason for Admission: Here with seizures, tiny infarcts  Cognitive/Mentation: A/Ox 4  Neuros/CMS: Stable w/ confused at times, R glass eye,  VS: VSS on RA.   Tele: NSR.  GI/: incontinent  Pulmonary: LS clear.  Pain: denies.   Drains/Lines: PIV SL  Skin: intact  Activity: Assist x 1 with gbw.  Diet: renal pureed diet with thin liquids. Takes pills whole. On carb count. NPO at midnight for MARZENA  Therapies recs: pending  Discharge: pending  Aggression Stoplight Tool: green  End of shift summary: No seizure activity noted on EEG. MARZENA at 1:30PM

## 2024-10-11 NOTE — PROVIDER NOTIFICATION
Paged Dr. Grier re: EEG. MARZENA scheduled for 1330, can we unhook patient by noon for procedure. Will await further advisement.    MD paged right back and not general neurologist but is epilepsy attending. Will paged General Neurology clinic to barbara Oglesby Stroke neuro fellow, John re: disconnect for procedure. Planned MARZENA for 1300, will await further advisement.

## 2024-10-11 NOTE — PLAN OF CARE
Reason for Admission: Acute CVA, seizures    Cognitive/Mentation: A/Ox 4  Neuros/CMS: Intact ex R glass eye  VS: Bradycardic, VSS on RA.   Tele: NSR.  /GI: Continent (INC at times)   Pulmonary: LS clear.  Pain: Denies.     Drains/Lines: PIV SL  Skin: Intact  Activity: Assist x 1 with GB + Walker.  Diet: Pureed with thin liquids. Takes pills whole.     Therapies recs: Pending  Discharge: Pending    Aggression Stoplight Tool: Green    End of shift summary: MARZENA completed

## 2024-10-11 NOTE — PROVIDER NOTIFICATION
Paged Hospitalist re: bradycardia. bradycardia in 40s after sedation for MARZENA. Will await further advisement. Vitals recorded.     1530MD aware, no new orders at this time.

## 2024-10-11 NOTE — PROGRESS NOTES
[facility]    Vascular Neurology Progress Note    Interval History     MARZENA completed today, no cardiac source of thrombus.  Notably more alert and interactive on exam today.  EEG without evidence of seizures    Hospital Course     Chief complaint: Slurred Speech and Altered Mental Status    64 year old male with history significant for DM1, severe CAD with recent STEMI and stent placement (7/2024), ESRD s/p renal transplant, and recent admission at [facility] for right corona radiata infarct with residual L hemiparesis (8/2024). He was brought to the ED 10/9/24 for evaluation following an episode of confusion and witnessed seizure with RUE shaking. Per notes, he was weak on the right following this episode. He is not able to provide any additional history regarding his presentation.      MRI brain demonstrates tiny acute infarcts in the right thalamus, right periatrial white matter and right cerebellum.  On initial evaluation, noted to be somnolent but improved over the course of the day.    Assessment and Plan     1. Acute ischemic infarcts in the right thalamus, right periatrial white matter, and right cerebellum due to embolic stroke of undetermined source. MARZENA without vegetation or cardiac source of thrombus.   2. Recent right corona radiata infarct (8/2024)     Secondary Stroke Prevention  - Continue prior dual antiplatelet therapy with ASA 81 mg + Brilinta 90 mg BID (recent coronary stent). No indication for anticoagulation at this time.   --- Repeat P2Y12 22, indicates appropriate anti-platelet effect.   - LDL 95, on Repatha + Zetia PTA. Titrate to goal LDL 40-70  - A1c 6.7, within goal <7.0  - Long term goal BP <130/80 with tighter control associated with decreased overall CV risk, if tolerated.   - PT/OT/SLP  - Stroke education when appropriate      Diagnostic Evaluation  - Telemetry while inpatient. If workup unrevealing, will need discharge with 14 day Ziopatch to screen for  "atrial fibrillation (ordered)     3. First time seizure, etiology unclear. No cortical left sided infarcts or encephalomalacia to suggest seizure focus. He is febrile, and infection could have lowered seizure threshold. No seizures on subsequent EEG monitoring  - Loaded with Keppra 1000 mg in the ED, continue Keppra 750 mg BID.   - Discontinue EEG ordered     4. Encephalopathy, resolved. Suspect post-ictal state. Afebrile since admission, blood cultures no growth.     DVT Prophylaxis: DAPT    Patient Follow-up    - in 6-8 weeks with general neurology ([phone])    We will continue to follow.       Chinyere Waller, CNP  Vascular Neurology    To page me or covering stroke neurology team member, click here: AMCOM  Choose \"On Call\" tab at top, then select \"NEUROLOGY/ALL SITES\" from middle drop-down box, press Enter, then look for \"stroke\" or \"telestroke\" for your site.    Physical Examination     Temp: 98.3  F (36.8  C) Temp src: Axillary BP: 120/67 Pulse: (!) 49   Resp: 12 SpO2: 100 % O2 Device: None (Room air) Oxygen Delivery: 2 LPM    Neurologic  Mental Status:  follows commands, speech clear and fluent, naming and repetition normal, alert, sometimes slow to answer but speech intact  Cranial Nerves:  visual fields intact, facial sensation intact and symmetric, facial movements symmetric, hearing not formally tested but intact to conversation, no dysarthria, tongue protrusion midline, prosthetic R eye  Motor:  normal muscle tone and bulk, no abnormal movements, able to move all limbs spontaneously, no pronator drift  Reflexes:   deferred   Sensory:  light touch sensation intact and symmetric throughout upper and lower extremities, no extinction on double simultaneous stimulation   Coordination:   FTN without dysmetria  Station/Gait:  deferred    Stroke Scales       NIHSS  1a. Level of Consciousness 0-->Alert, keenly responsive   1b. LOC Questions 0-->Answers both questions correctly   1c. LOC Commands 0-->Performs " both tasks correctly   2.   Best Gaze 0-->Normal   3.   Visual 0-->No visual loss (prosthetic R eye)   4.   Facial Palsy 0-->Normal symmetrical movements   5a. Motor Arm, Left 0-->No drift, limb holds 90 (or 45) degrees for full 10 secs   5b. Motor Arm, Right 0-->No drift, limb holds 90 (or 45) degrees for full 10 secs   6a. Motor Leg, Left 0-->No drift, leg holds 30 degree position for full 5 secs   6b. Motor Leg, right 0-->No drift, leg holds 30 degree position for full 5 secs   7.   Limb Ataxia 0-->Absent   8.   Sensory 0-->Normal, no sensory loss   9.   Best Language 0-->No aphasia, normal   10. Dysarthria 0-->Normal   11. Extinction and Inattention  0-->No abnormality   Total 0 (10/11/24 1508)       Imaging/Labs   (Bolded imaging and labs new and/or personally reviewed or re-reviewed by me today)    MRI/Head CT 1.  Tiny acute infarcts right thalamus, right periatrial white matter, and right cerebellar white matter.  2.  Possible subacute infarct or T2 shine through right corona radiata.  3.  Small old lacunar infarcts right basal ganglia, right corona radiata, and left periatrial white matter and presumed moderate microvascular ischemic changes.   Intracranial Vasculature No significant stenosis. Multiple absent teeth with dental carious diseas    Cervical Vasculature Predominantly noncalcified, mildly irregular atherosclerotic plaque at the bilateral carotid bifurcations and ICA origins. Severe right and moderate-severe left C5-C6 foraminal narrowing.       Echocardiogram EF 55-60%, no WMA, negative bubble study, no significant valvular disease  MARZENA: EF 55-60%, normal LA, negative bubble, no thrombus in CATRACHITA   EKG/Telemetry sinus   Other Testing EEG 10/10: diffuse moderate encephalopathy, no seizure  EEG 10/11: moderate diffuse encephalopathy, no epileptiform discharges or seizures      LDL  10/9/2024: 95 mg/dL   A1C  6.7 on 8/9   Troponin 10/9/2024: 12 ng/L     Other labs reviewed by me today:  P2Y12  22    Time Spent on this Encounter   Billing: I have personally spent a total of 30 minutes providing care today, time spent in reviewing medical records and devising the plan as recorded above.

## 2024-10-11 NOTE — PROGRESS NOTES
[facility]  Medicine Progress Note - Hospitalist Service  Date of Admission:  10/9/2024    Assessment & Plan   Very pleasant 64-year-old man with history of type 1 diabetes, end-stage renal disease s/p renal transplant on immunosuppressants, severe coronary artery disease with recent STEMI s/p ARNULFO x 2 in July 2024, history of CVA who was admitted for acute stroke.     Acute CVA of the R thalamus, cerebellum and parietal white matter  Generalized tonic/clonic Seizures w/ post ictal state  Possible subacute infarct of right corona radiata   Chronic infarct of R corona radiata 8/2024, along w/ R basal ganglia and L parietal white matter, residual L weakness  History of L lacunar infarct 1/2024  -Stroke neuro consult recommendations ongoing  -PT, OT evaluations  -Speech evaluation  -resumed aspirin, Brilinta  -resumed zetia  -seizure and fall precautions  -Neuro checks q4  -EEG. Continuous EEG given ongoing suspected seizures.  So far consistent with diffuse encephalopathy, rather than a focal seizure process.  -Defer antiepileptic medications to neurology   -10/11: MARZENA today to rule out an embolic source of stroke     Hypertension  Dyslipidemia  CAD, Hx STEMI ARNULFO x 2 to mRCA (7/19/24), ARNULFO x 2 to LAD (2019)   Obesity  TTE done 7/20/24 and demonstrated preserved ejection fraction (EF 60%), in addition to new wall-motion abnormalities in basal inferior and basal inferolateral regions   -holding amlodipine for permissive hypertension  -resumed zetia  -resumed metoprolol  -resumed aspirin, brilinta  -resume home repatha on discharge     Addendum: 4:30 PM  Sinus bradycardia  Noted after returning to neuro unit post MARZENA. Likely physiologic and vagal mediated process associated with procedure. STEMI was RCA (inferior MI) but this has been stented.   -monitor on telemetry    Insulin-dependent diabetes  Managed prior to admission Lantus 18 units at bedtime and lispro/Humalog 3 times daily sliding scale  with meals.  Hemoglobin A1c 6.7% in August 2024.  -Corrective dose aspart  -Mod CHO diet   -Halved nighttime insulin lantus dose while NPO. 10/10: increase to outpatient dose tonight.     Hx ESRD s/p kidney transplant  Lactic Acidosis, likely 2/2 Seizures  Creatinine at baseline (1.4-1.6) on presentation  -resumed mycophenolate, prednisone and tacrolimus  -consider Nephrology consult if kidney function worsens     R eye enucleation s/p prosthetic eye  Glaucoma  -resumed eye drops     GERD  -resumed PPI          Diet: Room Service  NPO per Anesthesia Guidelines for Procedure/Surgery Except for: Meds    DVT Prophylaxis: Pneumatic Compression Devices and Ambulate every shift  Heck Catheter: Not present  Lines: None     Cardiac Monitoring: ACTIVE order. Indication: Stroke, acute (48 hours)  Code Status: No CPR- Do NOT Intubate      Clinically Significant Risk Factors                   # Hypertension: Noted on problem list                      Disposition Plan     Medically Ready for Discharge: Anticipated in 2-4 Days still undergoing active evaluation of neurologic problems     Janis Mobley MD  Hospitalist Service  [facility]  Securely message with [email or web address] (more info)  Text page via [employer] Paging/Directory   ______________________________________________________________________    Interval History   Stable overnight.  This morning he is sitting up in the chair, EEG is still in progress.  MARZENA is planned for early this afternoon.  Denies any chest pain or pressure.  We reviewed his labs and that he is still taking his transplant medicines and the heart medicines that he needs after the relatively recent myocardial infarction.    Called to update wife Evelyne via phone and left a message at the number listed in epic.  Was able to call and talk with their daughter Brittny, and we discussed to the findings so far, plans for today and potential next steps in his neurologic care.      Physical Exam    Vital Signs: Temp: 98.3  F (36.8  C) Temp src: Axillary BP: 120/63 Pulse: 57   Resp: 17 SpO2: 98 % O2 Device: None (Room air)    Weight: 236 lbs 15.91 oz    General Appearance: Sitting up in chair, smiling and interactive  Respiratory: clear, nonlabored, no cough  Cardiovascular: Regular, trace lower extremity edema  GI: Protuberant, soft, nontender  Skin: No acute rashes or lesions, no jaundice  Neuro: Speech somewhat dysarthric but intelligible, Face symmetric, able to lift extremities and follow commands    Medical Decision Making       52 MINUTES SPENT BY ME on the date of service doing chart review, history, exam, documentation & further activities per the note.  MANAGEMENT DISCUSSED with the following over the past 24 hours: Patient, bedside nurse, stroke team   NOTE(S)/MEDICAL RECORDS REVIEWED over the past 24 hours: Neurology consultation notes, EEG interpretation, medication administration record, nursing notes  Tests ORDERED & REVIEWED in the past 24 hours:  - See lab/imaging results included in the data section of the note  SUPPLEMENTAL HISTORY, in addition to the patient's history, over the past 24 hours obtained from:   - Daughter via phone       Data         Imaging results reviewed over the past 24 hrs:   No results found for this or any previous visit (from the past 24 hour(s)).

## 2024-10-12 ENCOUNTER — APPOINTMENT (OUTPATIENT)
Dept: PHYSICAL THERAPY | Facility: CLINIC | Age: 64
DRG: 065 | End: 2024-10-12
Attending: STUDENT IN AN ORGANIZED HEALTH CARE EDUCATION/TRAINING PROGRAM
Payer: MEDICARE

## 2024-10-12 ENCOUNTER — APPOINTMENT (OUTPATIENT)
Dept: OCCUPATIONAL THERAPY | Facility: CLINIC | Age: 64
DRG: 065 | End: 2024-10-12
Attending: NURSE PRACTITIONER
Payer: MEDICARE

## 2024-10-12 ENCOUNTER — APPOINTMENT (OUTPATIENT)
Dept: SPEECH THERAPY | Facility: CLINIC | Age: 64
DRG: 065 | End: 2024-10-12
Attending: STUDENT IN AN ORGANIZED HEALTH CARE EDUCATION/TRAINING PROGRAM
Payer: MEDICARE

## 2024-10-12 LAB
GLUCOSE BLDC GLUCOMTR-MCNC: 167 MG/DL (ref 70–99)
GLUCOSE BLDC GLUCOMTR-MCNC: 229 MG/DL (ref 70–99)
GLUCOSE BLDC GLUCOMTR-MCNC: 423 MG/DL (ref 70–99)
GLUCOSE BLDC GLUCOMTR-MCNC: 458 MG/DL (ref 70–99)

## 2024-10-12 PROCEDURE — 250N000012 HC RX MED GY IP 250 OP 636 PS 637: Performed by: STUDENT IN AN ORGANIZED HEALTH CARE EDUCATION/TRAINING PROGRAM

## 2024-10-12 PROCEDURE — 250N000011 HC RX IP 250 OP 636: Performed by: NURSE PRACTITIONER

## 2024-10-12 PROCEDURE — 99233 SBSQ HOSP IP/OBS HIGH 50: CPT | Performed by: INTERNAL MEDICINE

## 2024-10-12 PROCEDURE — 250N000013 HC RX MED GY IP 250 OP 250 PS 637: Performed by: INTERNAL MEDICINE

## 2024-10-12 PROCEDURE — 97161 PT EVAL LOW COMPLEX 20 MIN: CPT | Mod: GP

## 2024-10-12 PROCEDURE — 258N000003 HC RX IP 258 OP 636: Performed by: NURSE PRACTITIONER

## 2024-10-12 PROCEDURE — 92526 ORAL FUNCTION THERAPY: CPT | Mod: GN

## 2024-10-12 PROCEDURE — 97535 SELF CARE MNGMENT TRAINING: CPT | Mod: GO

## 2024-10-12 PROCEDURE — 120N000001 HC R&B MED SURG/OB

## 2024-10-12 PROCEDURE — 99233 SBSQ HOSP IP/OBS HIGH 50: CPT | Mod: FS

## 2024-10-12 PROCEDURE — 97530 THERAPEUTIC ACTIVITIES: CPT | Mod: GP

## 2024-10-12 PROCEDURE — 250N000013 HC RX MED GY IP 250 OP 250 PS 637: Performed by: STUDENT IN AN ORGANIZED HEALTH CARE EDUCATION/TRAINING PROGRAM

## 2024-10-12 RX ORDER — LEVETIRACETAM 750 MG/1
750 TABLET ORAL 2 TIMES DAILY
Status: DISCONTINUED | OUTPATIENT
Start: 2024-10-12 | End: 2024-10-13 | Stop reason: HOSPADM

## 2024-10-12 RX ADMIN — MYCOPHENOLATE MOFETIL 500 MG: 250 CAPSULE ORAL at 08:34

## 2024-10-12 RX ADMIN — MYCOPHENOLATE MOFETIL 500 MG: 250 CAPSULE ORAL at 20:35

## 2024-10-12 RX ADMIN — PREDNISONE 5 MG: 5 TABLET ORAL at 08:33

## 2024-10-12 RX ADMIN — LEVETIRACETAM 750 MG: 750 TABLET, FILM COATED ORAL at 20:35

## 2024-10-12 RX ADMIN — PANTOPRAZOLE SODIUM 40 MG: 40 TABLET, DELAYED RELEASE ORAL at 20:35

## 2024-10-12 RX ADMIN — TICAGRELOR 90 MG: 90 TABLET ORAL at 08:33

## 2024-10-12 RX ADMIN — TICAGRELOR 90 MG: 90 TABLET ORAL at 20:35

## 2024-10-12 RX ADMIN — TACROLIMUS 5 MG: 5 CAPSULE ORAL at 20:37

## 2024-10-12 RX ADMIN — LEVETIRACETAM 750 MG: 100 INJECTION, SOLUTION INTRAVENOUS at 11:45

## 2024-10-12 RX ADMIN — EZETIMIBE 10 MG: 10 TABLET ORAL at 08:33

## 2024-10-12 RX ADMIN — METOPROLOL SUCCINATE 50 MG: 50 TABLET, EXTENDED RELEASE ORAL at 20:35

## 2024-10-12 RX ADMIN — BRIMONIDINE TARTRATE 1 DROP: 2 SOLUTION/ DROPS OPHTHALMIC at 20:38

## 2024-10-12 RX ADMIN — BRIMONIDINE TARTRATE 1 DROP: 2 SOLUTION/ DROPS OPHTHALMIC at 08:34

## 2024-10-12 RX ADMIN — ASPIRIN 81 MG: 81 TABLET, COATED ORAL at 08:33

## 2024-10-12 RX ADMIN — TACROLIMUS 5 MG: 5 CAPSULE ORAL at 08:33

## 2024-10-12 ASSESSMENT — ACTIVITIES OF DAILY LIVING (ADL)
ADLS_ACUITY_SCORE: 29
ADLS_ACUITY_SCORE: 25
ADLS_ACUITY_SCORE: 27
ADLS_ACUITY_SCORE: 26
ADLS_ACUITY_SCORE: 27
ADLS_ACUITY_SCORE: 26
ADLS_ACUITY_SCORE: 26
ADLS_ACUITY_SCORE: 27
ADLS_ACUITY_SCORE: 25
ADLS_ACUITY_SCORE: 27
ADLS_ACUITY_SCORE: 25
ADLS_ACUITY_SCORE: 27
ADLS_ACUITY_SCORE: 25
ADLS_ACUITY_SCORE: 27
ADLS_ACUITY_SCORE: 28
ADLS_ACUITY_SCORE: 27
ADLS_ACUITY_SCORE: 26

## 2024-10-12 NOTE — PROGRESS NOTES
[facility]    Vascular Neurology Progress Note    Interval History     - MARZENA completed yesterday. MARZENA without vegetation or cardiac source of thrombus.  - Blood cultures: No growth after 2 days  - Overnight: Afebrile overnight, SBP range 120-141. No acute events.     Hospital Course     Chief complaint: Slurred Speech and Altered Mental Status    64 year old male with history significant for DM1, severe CAD with recent STEMI and stent placement (7/2024), ESRD s/p renal transplant, and [place] [facility] [place]ona radiata infarct with residual L hemiparesis (8/2024). He was brought to the ED 10/9/24 for evaluation following an episode of confusion and witnessed seizure with RUE shaking. Per notes, he was weak on the right following this episode. He is not able to provide any additional history regarding his presentation. MRI brain demonstrates tiny acute infarcts in the right thalamus, right periatrial white matter and right cerebellum.     Assessment and Plan     1. Acute ischemic infarcts in the right thalamus, right periatrial white matter, and right cerebellum due to embolic stroke of undetermined source. MARZENA without vegetation or cardiac source of thrombus.   2. Recent right corona radiata infarct (8/2024)     Secondary Stroke Prevention  - Continue prior dual antiplatelet therapy with ASA 81 mg + Brilinta 90 mg BID (recent coronary stent). No indication for anticoagulation at this time.   - LDL 95, on Repatha + Zetia PTA. Titrate to goal LDL 40-70  - A1c 6.7, within goal <7.0  - Long term goal BP <130/80 with tighter control associated with decreased overall CV risk, if tolerated. Okay to gradually resume PTA HTN medications.  - Continue PT/OT/SLP as able  - Stroke education     Diagnostic Evaluation  - Telemetry while inpatient. If workup unrevealing, will need discharge with 14 day Ziopatch to screen for atrial fibrillation (ordered)   - Will discuss implantable loop  "recorder at follow-up     3. First time seizure, etiology unclear. No cortical left sided infarcts or encephalomalacia to suggest seizure focus. He is febrile, and infection could have lowered seizure threshold. No seizures on subsequent EEG monitoring  - Continue Keppra 750 mg BID  - Regarding seizure: [place] law requires no driving for at least 3 months following seizure. Avoid operating heavy machinery, heights, baths or independent swimming      4. Encephalopathy, resolved. Suspect post-ictal state. Afebrile since admission, blood cultures no growth x 2 days      DVT Prophylaxis: DAPT, SCDs     Patient Follow-up    - with your PCP in 1-2 weeks   - in 6-8 weeks with general neurology ([phone]) (ordered)    No further stroke evaluation is recommended, so we will sign off. Please contact us with any additional questions.    Torrie Yoder PA-C  Vascular Neurology    To page me or covering stroke neurology team member, click here: AMCOM  Choose \"On Call\" tab at top, then select \"NEUROLOGY/ALL SITES\" from middle drop-down box, press Enter, then look for \"stroke\" or \"telestroke\" for your site.    Physical Examination     Temp: 97.6  F (36.4  C) Temp src: Oral BP: (!) 141/68 Pulse: 53   Resp: 18 SpO2: 98 % O2 Device: None (Room air) Oxygen Delivery: 2 LPM    General Exam  General:  patient sitting up right in bed without any acute distress    HEENT:  normocephalic/atraumatic  Pulmonary:  no respiratory distress    Neuro Exam  Mental Status: interactive during examination, oriented to self and situation, follows commands, naming normal  Cranial Nerves: right eye lid droop (glass eye), left eye EOM intact, mild dysarthria, tongue protrusion midline   Motor:  no abnormal movements, antigravity with bilateral upper and lower extremities   Reflexes:  Deferred  Sensory:  light touch sensation intact in upper and lower extremities   Coordination:  Deferred  Station/Gait:  Deferred    Imaging/Labs   (Bolded " imaging and labs new and/or personally reviewed or re-reviewed by me today)    MRI/Head CT MRI Brain:   Tiny acute infarcts right thalamus, right periatrial white matter, and right cerebellar white matter.  Possible subacute infarct or T2 shine through right corona radiata.  Small old lacunar infarcts right basal ganglia, right corona radiata, and left periatrial white matter and presumed moderate microvascular ischemic changes.   Intracranial Vasculature No significant stenosis. Multiple absent teeth with dental carious diseas    Cervical Vasculature Predominantly noncalcified, mildly irregular atherosclerotic plaque at the bilateral carotid bifurcations and ICA origins. Severe right and moderate-severe left C5-C6 foraminal narrowing.       Echocardiogram EF 55-60%, no WMA, negative bubble study, no significant valvular disease  MARZENA: EF 55-60%, normal LA, negative bubble, no thrombus in CATRACHITA   EKG/Telemetry Sinus rhythm   Other Testing EEG 10/10: diffuse moderate encephalopathy, no seizure  EEG 10/11: moderate diffuse encephalopathy, no epileptiform discharges or seizures      LDL  10/9/2024: 95 mg/dL   A1C  8/9/2024: 6.7    Troponin 10/9/2024: 12 ng/L      Other labs reviewed by me today:  CBC and BMP (collected yesterday, not today)    Time Spent on this Encounter   Billing: I have personally spent a total of 30 minutes providing care today, time spent in reviewing medical records and devising the plan as recorded above.

## 2024-10-12 NOTE — PLAN OF CARE
Rule out seizure/strokes. Neuros/CMS - arouses to voice & spontaneous, more alert today, tongue appears white coating/sore from biting possible; disoriented/forgetful to exact day of week, place; following commands; generalized weakness; left grasp stronger today/no ataxia; vision deficits baseline - right glass eye/glasses helpful; No dizziness.  Missing digits on his feet/left great toe and right middle toe. VSS, RA. Tele SB 50s, discontinued. Pureed diet with mildly thick liquids, advanced to soft and thin liquids/meds whole with water/appetite improving. Up with 1 assist/gait belt & walker. Seizure precautions in place, no seizure activity noted. Continent of bladder/incontinent of bowels this afternoon - two loose stools after late lunch. Denies pain. Pt scoring green on the Aggression Stop Light Tool. Home with assist or TCU when medically stable for discharge. Family arrived afternoon/supportive.

## 2024-10-12 NOTE — CONSULTS
Care Management Initial Consult    General Information  Assessment completed with: Patient,  (Solo)  Type of CM/SW Visit: Initial Assessment    Primary Care Provider verified and updated as needed: No   Readmission within the last 30 days: no previous admission in last 30 days      Reason for Consult: discharge planning  Advance Care Planning:            Communication Assessment  Patient's communication style: spoken language (English or Bilingual)    Hearing Difficulty or Deaf: no   Wear Glasses or Blind: no    Cognitive  Cognitive/Neuro/Behavioral: .WDL except (no changes)  Level of Consciousness: confused, alert  Arousal Level: arouses to voice  Orientation: disoriented to, time, place  Mood/Behavior: calm, cooperative  Best Language: 0 - No aphasia  Speech: logical, spontaneous, clear    Living Environment:   People in home: spouse  Evelyne  Current living Arrangements: house      Able to return to prior arrangements:         Family/Social Support:  Care provided by: self, spouse/significant other  Provides care for: no one  Marital Status:   Support system: Wife, Children          Description of Support System: Supportive, Involved    Support Assessment: Adequate family and caregiver support    Current Resources:   Patient receiving home care services: No        Community Resources:    Equipment currently used at home: walker, rolling  Supplies currently used at home:      Employment/Financial:  Employment Status: retired     Employment/ Comments: shaikh  Financial Concerns: none   Referral to Financial Worker: No       Does the patient's insurance plan have a 3 day qualifying hospital stay waiver?  No    Lifestyle & Psychosocial Needs:  Social Determinants of Health     Food Insecurity: No Food Insecurity (8/10/2024)    Received from [facility]    Hunger Vital Sign     Worried About Running Out of Food in the Last Year: Never true     Ran Out of Food in the Last Year: Never true    Depression: Not at risk (8/9/2024)    Received from [facility]    PHQ-2     PHQ-2 Subtotal: 2   Housing Stability: Low Risk  (8/10/2024)    Received from [facility]    Housing Stability     What is your housing situation today?: 3 - I have housing   Tobacco Use: Low Risk  (7/5/2024)    Received from [facility]    Patient History     Smoking Tobacco Use: Never     Smokeless Tobacco Use: Never     Passive Exposure: Not on file   Financial Resource Strain: Low Risk  (8/10/2024)    Received from [facility]    Overall Financial Resource Strain (CARDIA)     Difficulty of Paying Living Expenses: Not hard at all   Alcohol Use: Not on file   Transportation Needs: No Transportation Needs (8/10/2024)    Received from [facility]    PRAPARE - Transportation     Lack of Transportation (Medical): No     Lack of Transportation (Non-Medical): No   Physical Activity: Not on file   Interpersonal Safety: Low Risk  (10/10/2024)    Interpersonal Safety     Do you feel physically and emotionally safe where you currently live?: Yes     Within the past 12 months, have you been hit, slapped, kicked or otherwise physically hurt by someone?: No     Within the past 12 months, have you been humiliated or emotionally abused in other ways by your partner or ex-partner?: No   Stress: Not on file   Social Connections: Not on file   Health Literacy: Not on file       Functional Status:  Prior to admission patient needed assistance:              Mental Health Status:          Chemical Dependency Status:                Values/Beliefs:  Spiritual, Cultural Beliefs, Bahai Practices, Values that affect care:                 Discussed  Partnership in Safe Discharge Planning  document with patient/family: No    Additional Information:  Consult for discharge planning.     64-year-old man with history of type 1 diabetes, end-stage renal disease s/p renal transplant on immunosuppressants, severe coronary  artery disease with recent STEMI s/p ARNULFO x 2 in July 2024, history of CVA who was admitted for acute stroke.      had a stroke 8/8/24 and was admitted at [facility].  He then underwent inpatient rehab, TCU stay, and had recently returned home.  His deficits had been left hemiparesis.     At home today he was at his new baseline when he suddenly became confused and had slurred speech while talking with his daughter.  He had a witnessed seizure with RUE shaking.  Post-ictally he was very confused versus aphasic.  He was weak on the right per EMS.  He had decreased level of responsiveness and minimal speech on arrival.    Writer reviewed chart and recommendations at discharge. Writer met with patient at bedside and introduced self and role. Writer confirmed patient's home address as accurate. Patient explained he was recently at [facility] and had discharged home and then had another stroke. Both PT/OT recommend TCU. Patient just did 40 days at TCU and would prefer to go home with home care. Writer will speak with spouse or daughter to confirm       Next Steps: Speak with family about whether TCU or Home care and determine level of assistance provided at home    DMITRI Ortiz

## 2024-10-12 NOTE — PROGRESS NOTES
Care Management Follow Up    Length of Stay (days): 2    Expected Discharge Date: 10/14/2024     Concerns to be Addressed: discharge planning     Patient plan of care discussed at interdisciplinary rounds: Yes    Anticipated Discharge Disposition: Skilled Nursing Facility versus home care       Anticipated Discharge Services:    Anticipated Discharge DME:      Patient/family educated on Medicare website which has current facility and service quality ratings: yes  Education Provided on the Discharge Plan:    Patient/Family in Agreement with the Plan: yes    Referrals Placed by CM/SW:    Private pay costs discussed: Not applicable    Discussed  Partnership in Safe Discharge Planning  document with patient/family: No     Handoff Completed: No, handoff not indicated or clinically appropriate    Additional Information:  Writer met with patient wife Evelyne and daughter Brittny regarding discharge planning, patient just had 40 days at a TCU and had [facility] coming out to the home a few times a week. Family reports this is adequate and would like to take patient home with home care.     Next Steps: send a referral for home care to [facility]     DMITRI Ortiz

## 2024-10-12 NOTE — PLAN OF CARE
[id number]  Pt here with Acute CVA, Seizure. A&Ox4. Neuros are stable ex R glass eye.. VSS. Tele BB in 50s. Pureed  diet, mildly thick liquids. Takes pills whole. Up with A1/GBW. Denies pain. Pt scoring green on the Aggression Stop Light Tool. Plan. Discharge pending.

## 2024-10-12 NOTE — PROVIDER NOTIFICATION
Paged Hospitalist re: see sticky note on loose stools x 2 this afternoon/urgency & large; Diet soft and thin, diabetic control needed?. Will await any further advisement and continued monitoring intake and output.

## 2024-10-12 NOTE — PROGRESS NOTES
[facility]    Medicine Progress Note - Hospitalist Service    Date of Admission:  10/9/2024    Assessment & Plan   Very pleasant 64-year-old man with history of type 1 diabetes, end-stage renal disease s/p renal transplant on immunosuppressants, severe coronary artery disease with recent STEMI s/p ARNULFO x 2 in July 2024, history of CVA who was admitted for acute stroke.     Acute CVA of the R thalamus, cerebellum and parietal white matter  Generalized tonic/clonic Seizures w/ post ictal state  Possible subacute infarct of right corona radiata   Chronic infarct of R corona radiata 8/2024, along w/ R basal ganglia and L parietal white matter, residual L weakness  History of L lacunar infarct 1/2024  -Stroke neuro consult recommendations ongoing  -10/12: Request stroke comment on whether to resume amlodipine (currently still on hold from admission for permissive hypertension)  -PT, OT evaluations  -Speech re-evaluation daily until stable   -resumed aspirin, Brilinta, zetia  -seizure and fall precautions  -Neuro checks q4  -EEG. Had continuous EEG given ongoing suspected seizures.  So far consistent with diffuse encephalopathy, rather than a focal seizure process.  -Defer antiepileptic medications to neurology.  Should Keppra be continued or dose adjusted?  He may have a degree of sedation as a side effect.  -10/11: MARZENA negative for any embolic source of stroke     Hypertension  Dyslipidemia  CAD, Hx STEMI ARNULFO x 2 to mRCA (7/19/24), ARNULFO x 2 to LAD (2019)   Obesity  TTE done 7/20/24 and demonstrated preserved ejection fraction (EF 60%), in addition to new wall-motion abnormalities in basal inferior and basal inferolateral regions   -MARZENA as above  -holding amlodipine for permissive hypertension  -resumed zetia  -resumed metoprolol  -resumed aspirin, brilinta  -resume home repatha on discharge     Sinus bradycardia 10/11, resolved  Noted after returning to neuro unit post MARZENA. Likely physiologic and  "vagal mediated process associated with procedure. STEMI was RCA (inferior MI) but this has been stented.   -monitor on telemetry    Insulin-dependent diabetes  Managed prior to admission Lantus 18 units at bedtime and lispro/Humalog 3 times daily sliding scale with meals.  Hemoglobin A1c 6.7% in August 2024.  -medium Corrective dose aspart  -Mod CHO diet   -lantus dose resumed 10/10 evening at 18 ubits     Hx ESRD s/p kidney transplant  Lactic Acidosis, likely 2/2 Seizures  Creatinine at baseline (1.4-1.6) on presentation  -resumed mycophenolate, prednisone and tacrolimus  -consider Nephrology consult if kidney function worsens     R eye enucleation s/p prosthetic eye  Glaucoma  -resumed eye drops     GERD  -resumed PPI          Diet: Room Service  Combination Diet Soft and Bite Sized Diet (level 6); Thin Liquids (level 0) (NO STRAWS)    DVT Prophylaxis: Pneumatic Compression Devices and Ambulate every shift  Heck Catheter: Not present  Lines: None     Cardiac Monitoring: None  Code Status: No CPR- Do NOT Intubate      Clinically Significant Risk Factors                   # Hypertension: Noted on problem list                      Disposition Plan     Medically Ready for Discharge: Anticipated Tomorrow or Monday pending neuro recs, swallowing function progress, med adjustment of antiepileptic.      Janis Mobley MD  Hospitalist Service  [facility]  Securely message with [employer] (more info)  Text page via [employer] Paging/Directory   ______________________________________________________________________    Interval History   \"I want coffee\"   Expressed frustration about pureed diet and not being able to have regular coffee.  Assured him we will work with the speech pathologist today to either order further testing or make adjustments as appropriate.    We reviewed the echocardiogram from yesterday which did not show any vegetations or thrombus, which is reassuring, however it does not explain " any embolic source from the heart.  The EEG reports have not revealed any epileptiform discharges or electrographic seizures.  Findings consistent with moderate diffuse nonspecific encephalopathy.    Discussed with the patient's wife Evelyne via phone.  She and their daughter are visiting this afternoon.    Physical Exam   Vital Signs: Temp: 97.5  F (36.4  C) Temp src: Oral BP: 134/74 Pulse: 70   Resp: 16 SpO2: 100 % O2 Device: None (Room air) Oxygen Delivery: 2 LPM  Weight: 236 lbs 15.91 oz    General Appearance:  Sitting up in chair, smiling and interactive  Respiratory: clear, nonlabored, no cough  Cardiovascular: Regular, trace lower extremity edema  GI: Protuberant, soft, nontender  Skin: No acute rashes or lesions, no jaundice  Neuro: Speech less dysarthric but intelligible, Face symmetric, able to lift extremities and follow commands, observed walking with walker     Medical Decision Making       52 MINUTES SPENT BY ME on the date of service doing chart review, history, exam, documentation & further activities per the note.  MANAGEMENT DISCUSSED with the following over the past 24 hours: Patient, bedside nurse, speech pathology, neurology team   NOTE(S)/MEDICAL RECORDS REVIEWED over the past 24 hours: Neurology consult notes, nursing notes, therapy evaluations, EEG documentation  Tests ORDERED & REVIEWED in the past 24 hours:  - See lab/imaging results included in the data section of the note  SUPPLEMENTAL HISTORY, in addition to the patient's history, over the past 24 hours obtained from:   - Spouse or significant other      Data

## 2024-10-12 NOTE — PLAN OF CARE
Goal Outcome Evaluation:      Plan of Care Reviewed With: patient          Outcome Evaluation: Discharge to TCU or home care

## 2024-10-12 NOTE — PROGRESS NOTES
10/12/24 1130   Appointment Info   Signing Clinician's Name / Credentials (PT) Chaya Patiño, PT, DPT   Living Environment   People in Home spouse   Current Living Arrangements house   Home Accessibility stairs to enter home;stairs within home   Number of Stairs, Main Entrance 8   Stair Railings, Main Entrance railings safe and in good condition   Number of Stairs, Within Home, Primary greater than 10 stairs   Stair Railings, Within Home, Primary railings safe and in good condition   Transportation Anticipated family or friend will provide   Living Environment Comments Patient reports he just recently discharged home from rehab about 6 weeks ago.   Self-Care   Usual Activity Tolerance good   Current Activity Tolerance moderate   Regular Exercise No   Equipment Currently Used at Home walker, rolling   Fall history within last six months no   Activity/Exercise/Self-Care Comment Patient reports he has been using a walker at home, inconsistent historian throughout encounter.   General Information   Onset of Illness/Injury or Date of Surgery 10/09/24   Referring Physician Sebastian Snider MD   Patient/Family Therapy Goals Statement (PT) to go home as soon as possible   Pertinent History of Current Problem (include personal factors and/or comorbidities that impact the POC) Patient is 63 YO M who presented to hospital after a witnessed seizure and Acute CVA of the R thalamus, cerebellum and parietal white matter. Patient has PMH including T1DM, ESKD s/p renal transplant , T1DM, severe CAD with recent STEMI s/p DESx2 (07/2024), obesity,  and prior CVA 08/2024 with residual L sided weakness.   Existing Precautions/Restrictions fall;seizures   General Observations Patient sitting up in chair with RN present.   Cognition   Affect/Mental Status (Cognition) confused   Orientation Status (Cognition) oriented to;person;place   Follows Commands (Cognition) follows one-step commands;75-90% accuracy;repetition of directions  required   Safety Deficit (Cognition) awareness of need for assistance;insight into deficits/self-awareness;safety precautions awareness   Cognitive Status Comments Patient frequently making jokes during interaction.   Pain Assessment   Patient Currently in Pain No   Integumentary/Edema   Integumentary/Edema Comments R glass eye   Posture    Posture Forward head position;Protracted shoulders   Range of Motion (ROM)   Range of Motion ROM is WFL   Strength (Manual Muscle Testing)   Strength (Manual Muscle Testing) Deficits observed during functional mobility   Strength Comments FUnctional weakness noted during transfers   Bed Mobility   Comment, (Bed Mobility) Patient out of bed during session.   Sit-Stand Transfer   Sit-Stand [place] (Transfers) minimum assist (75% patient effort)   Assistive Device (Sit-Stand Transfers) walker, front-wheeled   Comment, (Sit-Stand Transfer) From recliner chair   Gait/Stairs (Locomotion)   [place] Level (Gait) contact guard   Assistive Device (Gait) walker, front-wheeled   Comment, (Gait/Stairs) Patient ambulated ~10' during eval with slow elio, forward flexed posture and downward gaze.   Balance   Balance Comments SBA for sitting balance; Needs UE support for standing balance on FWW   Sensory Examination   Sensory Perception patient reports no sensory changes   Sensory Perception Comments B LE grossly intact to light touch   Coordination   Coordination Comments B toe taps intact   Clinical Impression   Criteria for Skilled Therapeutic Intervention Yes, treatment indicated   PT Diagnosis (PT) impaired mobility   Influenced by the following impairments Weakness, impaired balance, decreased safety awareness, decreased activity tolerance   Functional limitations due to impairments Increased difficulty with functional transfers and ambulation   Clinical Presentation (PT Evaluation Complexity) stable   Clinical Presentation Rationale Medical status, clinical judgement    Clinical Decision Making (Complexity) low complexity   Planned Therapy Interventions (PT) balance training;bed mobility training;gait training;patient/family education;stair training;strengthening;transfer training;progressive activity/exercise   Risk & Benefits of therapy have been explained evaluation/treatment results reviewed;care plan/treatment goals reviewed;risks/benefits reviewed;current/potential barriers reviewed;participants voiced agreement with care plan;participants included;patient   PT Total Evaluation Time   PT Eval, Low Complexity Minutes (45015) 13   Physical Therapy Goals   PT Frequency 6x/week   PT Predicted Duration/Target Date for Goal Attainment 10/22/24   PT Goals Bed Mobility;Transfers;Gait;Stairs   PT: Bed Mobility Independent;Supine to/from sit   PT: Transfers Modified independent;Sit to/from stand;Bed to/from chair;Assistive device  (FWW)   PT: Gait Modified independent;Rolling walker;150 feet   PT: Stairs Supervision/stand-by assist;Greater than 10 stairs;Rail on right   Interventions   Interventions Quick Adds Therapeutic Activity   Therapeutic Activity   Therapeutic Activities: dynamic activities to improve functional performance Minutes (85355) 12   Symptoms Noted During/After Treatment Fatigue   Treatment Detail/Skilled Intervention Sit to stand from chair with cues for hand placements and Min A. Patient initiated ambulation with FWW and CGA (RN pushing IV pole), stopping every 3-4 steps for the first 20' due to L calf cramps. Patient ambulated ~ 160' total distances with CGA, cues for posture and foot clearance due to slight shuffling observed. Very slow elio throughout but no overt losses of balance. Patient returned to room, stand to sit into chair with cues for safety and CGA. Patient in recliner with alarm activated, RN and CNA in room.   PT Discharge Planning   PT Plan Assess bed mobility, indep with transfers, try stairs   PT Discharge Recommendation (DC Rec)  Transitional Care Facility   PT Rationale for DC Rec Patient is below baseline level of independence and requiring Ax1 for mobility at this time. Luis will benefit from skilled PT intervention at TCU to progress strength, balance, endurance and safety with mobility prior to returning to his [facility]-level home. Of note, patient declining TCU at this time, if home then 24/7 Ax1 recommended and HHPT, pending patient's ability to navigate stairs.   PT Brief overview of current status Ax1 with FWW; Goals of therapy will be to address safe mobility and make recs for d/c to next level of care. Pt and RN will continue to follow all falls risk precautions as documented by RN staff while hospitalized.   PT Equipment Needed at Discharge walker, rolling   Total Session Time   Timed Code Treatment Minutes 12   Total Session Time (sum of timed and untimed services) 25

## 2024-10-13 ENCOUNTER — APPOINTMENT (OUTPATIENT)
Dept: PHYSICAL THERAPY | Facility: CLINIC | Age: 64
DRG: 065 | End: 2024-10-13
Attending: STUDENT IN AN ORGANIZED HEALTH CARE EDUCATION/TRAINING PROGRAM
Payer: MEDICARE

## 2024-10-13 ENCOUNTER — APPOINTMENT (OUTPATIENT)
Dept: SPEECH THERAPY | Facility: CLINIC | Age: 64
DRG: 065 | End: 2024-10-13
Attending: STUDENT IN AN ORGANIZED HEALTH CARE EDUCATION/TRAINING PROGRAM
Payer: MEDICARE

## 2024-10-13 VITALS
DIASTOLIC BLOOD PRESSURE: 68 MMHG | RESPIRATION RATE: 18 BRPM | WEIGHT: 236.99 LBS | TEMPERATURE: 97.7 F | SYSTOLIC BLOOD PRESSURE: 108 MMHG | HEART RATE: 56 BPM | OXYGEN SATURATION: 97 %

## 2024-10-13 PROBLEM — N18.31 STAGE 3A CHRONIC KIDNEY DISEASE (H): Status: ACTIVE | Noted: 2024-10-13

## 2024-10-13 PROBLEM — E73.9 LACTOSE INTOLERANCE: Status: ACTIVE | Noted: 2024-10-13

## 2024-10-13 LAB
CREAT SERPL-MCNC: 1.42 MG/DL (ref 0.67–1.17)
EGFRCR SERPLBLD CKD-EPI 2021: 55 ML/MIN/1.73M2
GLUCOSE BLDC GLUCOMTR-MCNC: 148 MG/DL (ref 70–99)
GLUCOSE BLDC GLUCOMTR-MCNC: 249 MG/DL (ref 70–99)

## 2024-10-13 PROCEDURE — 250N000012 HC RX MED GY IP 250 OP 636 PS 637: Performed by: STUDENT IN AN ORGANIZED HEALTH CARE EDUCATION/TRAINING PROGRAM

## 2024-10-13 PROCEDURE — 250N000013 HC RX MED GY IP 250 OP 250 PS 637: Performed by: INTERNAL MEDICINE

## 2024-10-13 PROCEDURE — 250N000013 HC RX MED GY IP 250 OP 250 PS 637: Performed by: STUDENT IN AN ORGANIZED HEALTH CARE EDUCATION/TRAINING PROGRAM

## 2024-10-13 PROCEDURE — 97530 THERAPEUTIC ACTIVITIES: CPT | Mod: GP | Performed by: PHYSICAL THERAPY ASSISTANT

## 2024-10-13 PROCEDURE — 36415 COLL VENOUS BLD VENIPUNCTURE: CPT | Performed by: INTERNAL MEDICINE

## 2024-10-13 PROCEDURE — 92526 ORAL FUNCTION THERAPY: CPT | Mod: GN | Performed by: SPEECH-LANGUAGE PATHOLOGIST

## 2024-10-13 PROCEDURE — 82565 ASSAY OF CREATININE: CPT | Performed by: INTERNAL MEDICINE

## 2024-10-13 PROCEDURE — 99239 HOSP IP/OBS DSCHRG MGMT >30: CPT | Performed by: INTERNAL MEDICINE

## 2024-10-13 RX ORDER — LOPERAMIDE HYDROCHLORIDE 2 MG/1
2 CAPSULE ORAL 4 TIMES DAILY PRN
Status: DISCONTINUED | OUTPATIENT
Start: 2024-10-13 | End: 2024-10-13 | Stop reason: HOSPADM

## 2024-10-13 RX ORDER — LEVETIRACETAM 750 MG/1
750 TABLET ORAL 2 TIMES DAILY
Qty: 60 TABLET | Refills: 1 | Status: SHIPPED | OUTPATIENT
Start: 2024-10-13

## 2024-10-13 RX ADMIN — BRIMONIDINE TARTRATE 1 DROP: 2 SOLUTION/ DROPS OPHTHALMIC at 08:43

## 2024-10-13 RX ADMIN — EZETIMIBE 10 MG: 10 TABLET ORAL at 08:45

## 2024-10-13 RX ADMIN — LEVETIRACETAM 750 MG: 750 TABLET, FILM COATED ORAL at 08:45

## 2024-10-13 RX ADMIN — AMLODIPINE BESYLATE 10 MG: 10 TABLET ORAL at 08:45

## 2024-10-13 RX ADMIN — TICAGRELOR 90 MG: 90 TABLET ORAL at 08:45

## 2024-10-13 RX ADMIN — ASPIRIN 81 MG: 81 TABLET, COATED ORAL at 08:45

## 2024-10-13 RX ADMIN — TACROLIMUS 5 MG: 5 CAPSULE ORAL at 08:45

## 2024-10-13 RX ADMIN — MYCOPHENOLATE MOFETIL 500 MG: 250 CAPSULE ORAL at 08:44

## 2024-10-13 RX ADMIN — LOPERAMIDE HYDROCHLORIDE 2 MG: 2 CAPSULE ORAL at 12:32

## 2024-10-13 RX ADMIN — PREDNISONE 5 MG: 5 TABLET ORAL at 08:45

## 2024-10-13 ASSESSMENT — ACTIVITIES OF DAILY LIVING (ADL)
ADLS_ACUITY_SCORE: 24
ADLS_ACUITY_SCORE: 25
ADLS_ACUITY_SCORE: 24
ADLS_ACUITY_SCORE: 24
ADLS_ACUITY_SCORE: 25
ADLS_ACUITY_SCORE: 24
ADLS_ACUITY_SCORE: 25
ADLS_ACUITY_SCORE: 24
ADLS_ACUITY_SCORE: 25
ADLS_ACUITY_SCORE: 26
ADLS_ACUITY_SCORE: 24
ADLS_ACUITY_SCORE: 24

## 2024-10-13 NOTE — DISCHARGE SUMMARY
[facility]  Hospitalist Discharge Summary      Date of Admission:  10/9/2024  Date of Discharge:  10/13/2024 to home with [facility]  Discharging Provider: Janis Mobley MD  Discharge Service: Hospitalist Service    Discharge Diagnoses   See below    Clinically Significant Risk Factors          Follow-ups Needed After Discharge   Follow-up Appointments     Follow-up and recommended labs and tests       Follow up with primary care provider within 7-14 days for hospital   follow- up.  No follow up labs or test are needed.            Unresulted Labs Ordered in the Past 30 Days of this Admission       Date and Time Order Name Status Description    10/9/2024  8:35 PM Blood Culture Peripheral Blood Preliminary         These results will be followed up by myself or my group    Discharge Disposition   Discharged to home  Condition at discharge: Stable    Hospital Course   Very pleasant 64-year-old man with history of type 1 diabetes, end-stage renal disease s/p renal transplant on immunosuppressants, severe coronary artery disease with recent STEMI s/p ARNULFO x 2 in July 2024, history of CVA who was admitted for acute stroke.     Acute CVA of the R thalamus, cerebellum and parietal white matter  Generalized tonic/clonic Seizures w/ post ictal state  Possible subacute infarct of right corona radiata   Chronic infarct of R corona radiata 8/2024, along w/ R basal ganglia and L parietal white matter, residual L weakness  History of L lacunar infarct 1/2024  -Stroke neuro consulted and provided ongoing recommendations  -PT, OT evaluations  -Speech therapy evaluation done. Continue home SLP to advanced diet. Anticipate he will do well at home with improved rest and when better acclimated to the keppra dosing. Doesn't need video swallow at this time.   -resumed aspirin, Brilinta, zetia  -10/11: MARZENA negative for any embolic source of stroke  -EEG. Had continuous EEG given ongoing suspected seizures.  So far  consistent with diffuse encephalopathy, rather than a focal seizure process.  -Keppra 750 mg BID  -outpatient stroke follow up planned  -no driving for 3 months  -home health care will be resumed  -Home PT, OT, SLP ordered    Hypertension  Dyslipidemia  CAD, Hx STEMI ARNULFO x 2 to mRCA (7/19/24), ARNULFO x 2 to LAD (2019)   Obesity  TTE done 7/20/24 and demonstrated preserved ejection fraction (EF 60%), in addition to new wall-motion abnormalities in basal inferior and basal inferolateral regions   -MARZENA as above  -resumed zetia, metoprolol, aspirin, brilinta, amlodipine  -resume home repatha on discharge     Sinus bradycardia 10/11, resolved  Noted after returning to neuro unit post MARZENA. Likely physiologic and vagal mediated process associated with procedure. STEMI was RCA (inferior MI) but this has been stented.     Insulin-dependent diabetes  Managed prior to admission Lantus 18 units at bedtime and lispro/Humalog 3 times daily sliding scale with meals.  Hemoglobin A1c 6.7% in August 2024.  -medium corrective dose aspart  -Mod CHO diet   -lantus dose resumed 10/10 evening at 18 units     Hx ESRD s/p kidney transplant  Lactic Acidosis, likely 2/2 Seizures  Creatinine at baseline (1.4-1.6) on presentation  -resumed mycophenolate, prednisone and tacrolimus  Recent Labs   Lab 10/13/24  0840 10/10/24  0746 10/09/24  2045   CR 1.42* 1.37* 1.45*       R eye enucleation s/p prosthetic eye  Glaucoma  -resumed eye drops     GERD  -resumed PPI    Lactose Intolerance  Developed brief and self-limited bout of diarrhea due to consuming lactose-containing food.   -prn loperamide  -added lactose intolerance to diet order and problem list    Consultations This Hospital Stay   OCCUPATIONAL THERAPY ADULT IP CONSULT  PHYSICAL THERAPY ADULT IP CONSULT  CARE MANAGEMENT / SOCIAL WORK IP CONSULT  NEUROLOGY IP STROKE CONSULT  SPEECH LANGUAGE PATH ADULT IP CONSULT  PHARMACY IP CONSULT  PHARMACY IP CONSULT  PHARMACY IP CONSULT  PHYSICAL THERAPY  ADULT IP CONSULT  OCCUPATIONAL THERAPY ADULT IP CONSULT  REHAB ADMISSIONS LIAISON IP CONSULT  CARE MANAGEMENT / SOCIAL WORK IP CONSULT  VASCULAR ACCESS ADULT IP CONSULT  SMOKING CESSATION PROGRAM IP CONSULT    Code Status   No CPR- Do NOT Intubate    Time Spent on this Encounter   I, Janis Mobley MD, personally saw the patient today and spent greater than 30 minutes discharging this patient.       Janis Mobley MD  [facility] NE[facility]  [address] [place] [address]  Phone: [phone]  ______________________________________________________________________    Physical Exam   Vital Signs: Temp: 97.5  F (36.4  C) Temp src: Oral BP: 137/44 Pulse: 60   Resp: 18 SpO2: 99 % O2 Device: None (Room air)    Weight: 236 lbs 15.91 oz         Primary Care Physician   Physician No Ref-Primary    Discharge Orders      Home Care Referral      Reason for your hospital stay    Seizure related to prior stroke     Follow-up and recommended labs and tests     Follow up with primary care provider within 7-14 days for hospital follow- up.  No follow up labs or test are needed.     Activity    Your activity upon discharge: activity as tolerated and no driving for 3 months     No CPR- Do NOT Intubate     Diet    Follow this diet upon discharge:       Combination Diet Moderate Consistent Carb (60 g CHO per Meal) Diet; Soft and Bite Sized Diet (level 6); Thin Liquids (level 0) (NO STRAWS); No Lactose Diet     Stroke Hospital Follow Up (for neurologist use only)    [email or web address] will call you to coordinate care as prescribed by your provider. If you don t hear from a representative within 2 business days, please call [phone].    [email or web address] will call you to coordinate care as prescribed by your provider. If you don t hear from a representative within 2 business days, please call [phone].       ZIO PATCH MAIL OUT       Significant Results and Procedures   See epic    Discharge  Medications   Current Discharge Medication List        START taking these medications    Details   levETIRAcetam (KEPPRA) 750 MG tablet Take 1 tablet (750 mg) by mouth 2 times daily.  Qty: 60 tablet, Refills: 1    Associated Diagnoses: Seizure (H)           CONTINUE these medications which have NOT CHANGED    Details   amLODIPine (NORVASC) 10 MG tablet Take 1 tablet by mouth daily.      aspirin 81 MG EC tablet Take 81 mg by mouth daily.      brimonidine (ALPHAGAN) 0.2 % ophthalmic solution Place 1 drop Into the left eye 2 times daily.      evolocumab (REPATHA) 140 MG/ML prefilled autoinjector Inject 140 mg subcutaneously every 14 days.      ezetimibe (ZETIA) 10 MG tablet Take 10 mg by mouth daily.      insulin lispro (HUMALOG KWIKPEN) 100 UNIT/ML (1 unit dial) KWIKPEN Inject subcutaneously 3 times daily (before meals). Sliding scale      LANTUS SOLOSTAR 100 UNIT/ML soln Inject 18 Units subcutaneously at bedtime.      metoprolol succinate ER (TOPROL XL) 50 MG 24 hr tablet Take 50 mg by mouth every evening.      mycophenolate (GENERIC EQUIVALENT) 250 MG capsule Take 500 mg by mouth 2 times daily.      omeprazole (PRILOSEC) 40 MG DR capsule Take 40 mg by mouth every evening.      predniSONE (DELTASONE) 5 MG tablet Take 5 mg by mouth daily.      tacrolimus (GENERIC EQUIVALENT) 5 MG capsule Take 5 mg by mouth 2 times daily.      ticagrelor (BRILINTA) 90 MG tablet Take 1 tablet by mouth every 12 hours.           Allergies   No Known Allergies

## 2024-10-13 NOTE — PROGRESS NOTES
Care Management Discharge Note    Discharge Date: 10/13/2024       Discharge Disposition: Skilled Nursing Facility    Discharge Services:      Discharge DME:      Discharge Transportation: family or friend will provide    Private pay costs discussed: Not applicable    Does the patient's insurance plan have a 3 day qualifying hospital stay waiver?  No    PAS Confirmation Code:    Patient/family educated on Medicare website which has current facility and service quality ratings: yes    Education Provided on the Discharge Plan:    Persons Notified of Discharge Plans: Family, home care, provider  Patient/Family in Agreement with the Plan: yes    Handoff Referral Completed: No, handoff not indicated or clinically appropriate    Additional Information:  Writer sent home care discharge orders to [facility]   Safe discharge plan was followed     DMITRI Ortiz

## 2024-10-13 NOTE — PLAN OF CARE
Reason for Admission: Acute CVA, seizures    Cognitive/Mentation: A/Ox 4  Neuros/CMS: Intact ex R glass eye  VS: Bradycardic, VSS on RA.   /GI: Continent/ Continent  Pulmonary: LS clear.  Pain: Denies.     Drains/Lines: 2 PIV SL  Skin: Intact  Activity: Assist x 1 with GB + Walker.  Diet: Soft and bite size with thin liquids. Takes pills whole.     Therapies recs: Home health vs TCU  Discharge: Pending    Aggression Stoplight Tool: Green

## 2024-10-13 NOTE — PLAN OF CARE
Reason for Admission: Acute R CVA, Gen tonic/clonic seizure      Cognitive/Mentation: A/Ox 4  Neuros/CMS: Intact ex right glass eyes in place, slow/deliberate finger-to-nose and heel-to-shin  VS: VSS in RA.   /GI: Continent. Gets up to toilet. Last BM 10/12/2024.   Pulmonary: LS clear.  Pain: denies.     Drains/Lines: 2 R PIVs SL. R upper PIV dressing changed.  Skin: Intact  Activity: Assist x 1 with GBW.  Diet: Soft, bite-size with thin liquids. Takes pills whole.     Therapies recs: Home health vs. TCU  Discharge: Expected today    Aggression Stoplight Tool: Green    End of shift summary: Fair sleep between cares. Extremities are equally strong than previous.

## 2024-10-13 NOTE — DISCHARGE SUMMARY
Pt & spouse, Evelyne received AVS discharge paperwork & pt home medication. Pt home w/daughter & spouse.

## 2024-10-13 NOTE — PROVIDER NOTIFICATION
703 JS     Pt has had a blood sugar running high today. Last BGM check was @ 2100 and resulted @ 458 mg/dL. Wondering if any other recommendations should be made at this time for blood sugar control.     MD recommends giving 4 more units of lantus

## 2024-10-14 LAB — BACTERIA BLD CULT: NO GROWTH

## 2024-10-14 NOTE — PLAN OF CARE
Physical Therapy Discharge Summary    Reason for therapy discharge:    Discharged to home with home therapy.    Progress towards therapy goal(s). See goals on Care Plan in [facility] electronic health record for goal details.  Goals partially met.  Barriers to achieving goals:   discharge from facility.    Therapy recommendation(s):    Continued therapy is recommended.  Rationale/Recommendations:   . PT Discharge Planning:    PT Plan: Progress bed mobility, sit to/from stand transfers, and gait using wheeled walker.  Review stairs.  PT Discharge Recommendation (DC Rec): Transitional Care Facility  PT Rationale for DC Rec: Patient is below baseline level of independence and requiring Ax1 for mobility at this time. Luis will benefit from skilled PT intervention at TCU to progress strength, balance, endurance and safety with mobility prior to returning to his [facility]-level home. Of note, patient declining TCU at this time, if home then 24/7 Ax1 recommended and HHPT, pending patient's ability to navigate stairs.  PT Brief overview of current status: Assist of 1  PT Equipment Needed at Discharge: walker, rolling    Recommendation above provided by last treating therapist.

## 2024-10-14 NOTE — PLAN OF CARE
Speech Language Therapy Discharge Summary    Reason for therapy discharge:    Discharged to home with home therapy.    Progress towards therapy goal(s). See goals on Care Plan in [facility] electronic health record for goal details.  Goals not met.  Barriers to achieving goals:   discharge from facility.    Therapy recommendation(s):    Continued therapy is recommended.  Rationale/Recommendations:  Continue SLP needs for dysphagia management. Patient discharged on IDDSI level 6 soft/bite size and thins. Advance diet as tolerated. Video swallow study not completed due to discharge but may not need it due to tolerating the current diet at discharge.

## 2024-10-14 NOTE — PLAN OF CARE
Occupational Therapy Discharge Summary    Reason for therapy discharge:    Discharged to home with home therapy.    Progress towards therapy goal(s). See goals on Care Plan in [facility] electronic health record for goal details.  Goals partially met.  Barriers to achieving goals:   discharge from facility.    Therapy recommendation(s):    Continued therapy is recommended.  Rationale/Recommendations:  home care for increased activity tolerance and indep with functional mobility.

## 2024-10-15 ENCOUNTER — PATIENT OUTREACH (OUTPATIENT)
Dept: CARE COORDINATION | Facility: CLINIC | Age: 64
End: 2024-10-15
Payer: MEDICARE

## 2024-10-15 NOTE — PROGRESS NOTES
Connected Care Resource Center:   [facility] Contact  [facility]/Voicemail     Clinical Data: Post-Discharge Outreach     Outreach attempted x 2.  Left message on patient's voicemail, providing [facility]'s central phone number of [phone] ([phone]) for questions/concerns and/or to schedule an appt with an [facility] provider, if they do not have a PCP.      Plan:  [facility] will do no further outreaches at this time.       Sarai Delgadillo MA  Connected [facility] Resource Center, [facility]    *Connected Care Resource Team does NOT follow patient ongoing. Referrals are identified based on internal discharge reports and the outreach is to ensure patient has an understanding of their discharge instructions.

## 2024-10-18 ENCOUNTER — ORDERS ONLY (AUTO-RELEASED) (OUTPATIENT)
Dept: MEDSURG UNIT | Facility: CLINIC | Age: 64
End: 2024-10-18
Payer: MEDICARE

## 2024-10-18 DIAGNOSIS — I63.9 ACUTE CVA (CEREBROVASCULAR ACCIDENT) (H): ICD-10-CM

## (undated) RX ORDER — FENTANYL CITRATE 50 UG/ML
INJECTION, SOLUTION INTRAMUSCULAR; INTRAVENOUS
Status: DISPENSED
Start: 2024-10-11

## (undated) RX ORDER — GLYCOPYRROLATE 0.2 MG/ML
INJECTION, SOLUTION INTRAMUSCULAR; INTRAVENOUS
Status: DISPENSED
Start: 2024-10-11

## (undated) RX ORDER — LIDOCAINE HYDROCHLORIDE 40 MG/ML
SOLUTION TOPICAL
Status: DISPENSED
Start: 2024-10-11

## (undated) RX ORDER — MORPHINE SULFATE 2 MG/ML
INJECTION, SOLUTION INTRAMUSCULAR; INTRAVENOUS
Status: DISPENSED
Start: 2024-10-16

## (undated) RX ORDER — FLUMAZENIL 0.1 MG/ML
INJECTION, SOLUTION INTRAVENOUS
Status: DISPENSED
Start: 2024-10-11

## (undated) RX ORDER — NALOXONE HYDROCHLORIDE 0.4 MG/ML
INJECTION, SOLUTION INTRAMUSCULAR; INTRAVENOUS; SUBCUTANEOUS
Status: DISPENSED
Start: 2024-10-11